# Patient Record
Sex: FEMALE | Race: ASIAN | NOT HISPANIC OR LATINO | Employment: UNEMPLOYED | URBAN - METROPOLITAN AREA
[De-identification: names, ages, dates, MRNs, and addresses within clinical notes are randomized per-mention and may not be internally consistent; named-entity substitution may affect disease eponyms.]

---

## 2020-12-22 ENCOUNTER — OFFICE VISIT (OUTPATIENT)
Dept: FAMILY MEDICINE | Facility: CLINIC | Age: 25
End: 2020-12-22

## 2020-12-22 VITALS
TEMPERATURE: 98 F | HEIGHT: 59 IN | BODY MASS INDEX: 21.97 KG/M2 | SYSTOLIC BLOOD PRESSURE: 105 MMHG | RESPIRATION RATE: 16 BRPM | DIASTOLIC BLOOD PRESSURE: 65 MMHG | OXYGEN SATURATION: 97 % | HEART RATE: 86 BPM | WEIGHT: 109 LBS

## 2020-12-22 DIAGNOSIS — N91.2 AMENORRHEA: ICD-10-CM

## 2020-12-22 DIAGNOSIS — Z32.01 PREGNANCY TEST POSITIVE: Primary | ICD-10-CM

## 2020-12-22 LAB — HCG UR QL: POSITIVE

## 2020-12-22 PROCEDURE — 81025 URINE PREGNANCY TEST: CPT | Performed by: STUDENT IN AN ORGANIZED HEALTH CARE EDUCATION/TRAINING PROGRAM

## 2020-12-22 PROCEDURE — 99213 OFFICE O/P EST LOW 20 MIN: CPT | Mod: GC | Performed by: STUDENT IN AN ORGANIZED HEALTH CARE EDUCATION/TRAINING PROGRAM

## 2020-12-22 SDOH — HEALTH STABILITY: MENTAL HEALTH: HOW OFTEN DO YOU HAVE A DRINK CONTAINING ALCOHOL?: NEVER

## 2020-12-22 SDOH — HEALTH STABILITY: MENTAL HEALTH: HOW MANY STANDARD DRINKS CONTAINING ALCOHOL DO YOU HAVE ON A TYPICAL DAY?: NOT ASKED

## 2020-12-22 SDOH — HEALTH STABILITY: MENTAL HEALTH: HOW OFTEN DO YOU HAVE 6 OR MORE DRINKS ON ONE OCCASION?: NEVER

## 2020-12-22 ASSESSMENT — MIFFLIN-ST. JEOR: SCORE: 1144.66

## 2020-12-22 NOTE — NURSING NOTE
Due to patient being non-English speaking/uses sign language, an  was used for this visit. Only for face-to-face interpretation by an external agency, date and length of interpretation can be found on the scanned worksheet.     name: cristina  Agency: [employer]  Language: Jez   Telephone number: [phone]  Type of interpretation: Telephone, spoken

## 2020-12-22 NOTE — PROGRESS NOTES
Assessment and Plan     Pregnancy:   LMP August, and due to insurance issues hasn't received any prenatal care up to this point. FHT's 150s which is reassuring. This is her first pregnancy, planned. Not taking any meds. Denies any vaginal discharge/bleeding, abdominal pain, or any worrisome s/s. b-hcg positive!  - Prenatal vitamin (OTC as no insurance)  - Dating US to be scheduled  - Will hold off on labs today, even though she's probably 4 months pregnant as no insurance and pt refused.      Options for treatment and follow-up care were reviewed with the patient and/or guardian. Belkis Sanchez and/or guardian engaged in the decision making process and verbalized understanding of the options discussed and agreed with the final plan.    Ash Holman DO, MBA  [facility], PGY-2    Precepted patient with Dr. Bakari Borjas III       HPI:   Belkis Sanchez is a 25 year old female who presents to clinic today for   Chief Complaint   Patient presents with     Confirmation Of Pregnancy     LMP in August, unsure of exact date. first pregnancy.     Medication Reconciliation     complete     Amenorrhea:  - LMP August  - First visit today  - First pregnancy  - Planned pregnancy  - Not taking prenatal vitamin  - No abd pain, vaginal discharge, LE swelling  - Wants to know how far along she is      A Hmong  was used for this visit    Denies F, CP, SOB, changes in vision/hearing/GI//diet, abdominal pain, numbness/tingling, or any other concerns.         PMHX:   Active Problems List  There is no problem list on file for this patient.      Current Medications  No current outpatient medications on file.       Social History  Social History     Tobacco Use     Smoking status: Never Smoker     Smokeless tobacco: Never Used   Substance Use Topics     Alcohol use: Never     Frequency: Never     Binge frequency: Never     Drug use: Never     History   Drug Use Unknown        Family History  Family History   Problem Relation Age of Onset     No Known Problems Mother      No Known Problems Father      No Known Problems Maternal Grandmother      No Known Problems Maternal Grandfather      No Known Problems Paternal Grandmother      No Known Problems Paternal Grandfather      No Known Problems Brother      No Known Problems Sister      No Known Problems Son      No Known Problems Daughter      No Known Problems Maternal Half-Brother      No Known Problems Maternal Half-Sister      No Known Problems Paternal Half-Brother      No Known Problems Paternal Half-Sister      No Known Problems Niece      No Known Problems Nephew      No Known Problems Cousin      No Known Problems Other      Cancer No family hx of      Diabetes No family hx of      Coronary Artery Disease No family hx of      Heart Disease No family hx of      Hypertension No family hx of      Hyperlipidemia No family hx of      Kidney Disease No family hx of      Cerebrovascular Disease No family hx of      Obesity No family hx of      Thrombosis No family hx of      Asthma No family hx of      Arthritis No family hx of      Thyroid Disease No family hx of      Depression No family hx of      Mental Illness No family hx of      Substance Abuse No family hx of      Cystic Fibrosis No family hx of      Early Death No family hx of      Coronary Artery Disease Early Onset No family hx of      Heart Failure No family hx of      Bleeding Diathesis No family hx of      Dementia No family hx of      Breast Cancer No family hx of      Ovarian Cancer No family hx of      Uterine Cancer No family hx of      Prostate Cancer No family hx of      Colorectal Cancer No family hx of      Pancreatic Cancer No family hx of      Lung Cancer No family hx of      Melanoma No family hx of      Autoimmune Disease No family hx of      Unknown/Adopted No family hx of      Genetic Disorder No family hx of        Allergies  No Known Allergies          "Physical Exam:     Vitals:    12/22/20 1551   BP: 105/65   BP Location: Right arm   Pulse: 86   Resp: 16   Temp: 98  F (36.7  C)   TempSrc: Oral   SpO2: 97%   Weight: 49.4 kg (109 lb)   Height: 1.49 m (4' 10.66\")     Body mass index is 22.27 kg/m .    GENERAL APPEARANCE: alert, appears stated age, no acute distress  HEENT: Eyes grossly normal to inspection, nares normal, and mouth and throat without erythema, ulcers, or lesions  RESP: lungs clear to auscultation - no rales, rhonchi, or wheezes  CV: regular rate and rhythm, no murmur, click, rub, or gallop  ABDOMEN: soft, nontender  OB: FHT 150s   MSK: extremities normal, no gross deformities noted, no lower extremity edema  SKIN: no suspicious lesions or rashes   NEURO: Normal strength and tone, sensory exam grossly normal, mentation appears intact and speech normal  PSYCH: mood and affect normal/bright         "

## 2020-12-29 ENCOUNTER — TELEPHONE (OUTPATIENT)
Dept: FAMILY MEDICINE | Facility: CLINIC | Age: 25
End: 2020-12-29

## 2020-12-29 NOTE — TELEPHONE ENCOUNTER
Intake completed via telephone. Belkis is a  Hmong-speaking patient who presented to clinic with a positive UPT. Belkis was born in [facility] and is currently unemployed. Her , Aguila, works in medical assembly. Both are excited for this pregnancy. Belkis does not have a significant medical history and has received the varicella and influenza vaccine. Belkis is unsure of her LMP and notes it to be sometime in August. Belkis will follow with  for OB care and NOB appointment will be scheduled once patient has insurance in January.     Average Risk Category  No significant risk factors: No    At Risk Category (up to 3)  Teen pregnancy: No  Poor social situation: No  Domestic abuse: No  Financial difficulties: No  Smoker: No  H/O  deliver: No  H/O drug abuse: No  Non-English speaking: Yes  Advanced maternal age: No  GDM risks: No  Previous C/S: No  H/O PIH: No  H/O STIs: No  H/O mental health concerns: No  Onset care > 20 weeks: Yes  Other:     High Risk Category (4 or more At Risk or)  Diabetes/GDM: No  Multiple gestation: No  Chronic hypertension: No  Significant hx of asthma: No  Fetal demise > 20 weeks: No  Positive tox screen: No  Current mental health treatment: No  Other:     Risk: At Risk   Date determined: 2020  Osbaldo ADAMS

## 2020-12-29 NOTE — TELEPHONE ENCOUNTER
[facility] phone call message- general phone call:    Reason for call: Patient state waiting to hear back on results of urine test. Patient states they were not inform during visit what the results (pregnant or not) were and has not got a phone call either. Would like to know next step in pregnancy.    Return call needed: Yes    OK to leave a message on voice mail? Yes    Primary language: Hmong      needed? Yes    Call taken on December 29, 2020 at 9:35 AM by Gunnar Cooper

## 2020-12-29 NOTE — TELEPHONE ENCOUNTER
Called and discussed with patient positive pregnancy result. Patient planning to have insurance start in January after due to being added to her 's insurance. Advised will reach out second week of January if I have not heard back yet to schedule US and NOB. NOB to be scheduled with Dr.Hunt. Roblero RN

## 2020-12-30 NOTE — PROGRESS NOTES
Preceptor Attestation:   Patient seen, evaluated and discussed with the resident. I have verified the content of the note, which accurately reflects my assessment of the patient and the plan of care.    Supervising Physician:Bakari Borjas MD    [facility]

## 2021-01-04 NOTE — TELEPHONE ENCOUNTER
Patient called to schedule US and stated she only wants Female Provider for her OB. Patient requesting to change OB Provider.

## 2021-01-07 ENCOUNTER — ANCILLARY PROCEDURE (OUTPATIENT)
Dept: ULTRASOUND IMAGING | Facility: CLINIC | Age: 26
End: 2021-01-07
Attending: FAMILY MEDICINE
Payer: COMMERCIAL

## 2021-01-07 DIAGNOSIS — N91.2 AMENORRHEA: ICD-10-CM

## 2021-01-07 PROCEDURE — 76805 OB US >/= 14 WKS SNGL FETUS: CPT | Performed by: RADIOLOGY

## 2021-01-07 NOTE — NURSING NOTE
Due to patient being non-English speaking/uses sign language, an  was used for this visit. Only for face-to-face interpretation by an external agency, date and length of interpretation can be found on the scanned worksheet.     name: deborah [phone]   Agency: [employer]  Language: Jez   Telephone number: [phone]  Type of interpretation: Telephone, spoken

## 2021-01-12 ENCOUNTER — OFFICE VISIT (OUTPATIENT)
Dept: FAMILY MEDICINE | Facility: CLINIC | Age: 26
End: 2021-01-12
Payer: COMMERCIAL

## 2021-01-12 VITALS
DIASTOLIC BLOOD PRESSURE: 63 MMHG | RESPIRATION RATE: 16 BRPM | HEIGHT: 59 IN | SYSTOLIC BLOOD PRESSURE: 99 MMHG | TEMPERATURE: 97.7 F | HEART RATE: 83 BPM | BODY MASS INDEX: 22.78 KG/M2 | WEIGHT: 113 LBS | OXYGEN SATURATION: 100 %

## 2021-01-12 DIAGNOSIS — Z34.02 SUPERVISION OF NORMAL FIRST PREGNANCY IN SECOND TRIMESTER: Primary | ICD-10-CM

## 2021-01-12 LAB
BILIRUBIN UR: NEGATIVE MG/DL
BLOOD UR: NEGATIVE MG/DL
GLU GEST SCREEN 1HR 50G: 201 (ref 0–129)
GLUCOSE URINE: ABNORMAL
HCT VFR BLD AUTO: 37.8 % (ref 35–47)
HEMOGLOBIN: 11.8 G/DL (ref 11.7–15.7)
HIV 1+2 AB+HIV1 P24 AG SERPL QL IA: NEGATIVE
KETONES UR QL: NEGATIVE MG/DL
LEUKOCYTE ESTERASE UR: NEGATIVE
MCH RBC QN AUTO: 31.5 PG (ref 26.5–35)
MCHC RBC AUTO-ENTMCNC: 31.2 G/DL (ref 32–36)
MCV RBC AUTO: 100.7 FL (ref 78–100)
NITRITE UR QL STRIP: NEGATIVE MG/DL
PH UR STRIP: 7 [PH] (ref 4.5–8)
PLATELET # BLD AUTO: 242 K/UL (ref 150–450)
PROTEIN UR: NEGATIVE MG/DL
RBC # BLD AUTO: 3.75 M/UL (ref 3.8–5.2)
SP GR UR STRIP: 1.02 (ref 1–1.03)
UROBILINOGEN UR STRIP-ACNC: ABNORMAL E.U./DL
WBC # BLD AUTO: 13.2 K/UL (ref 4–11)

## 2021-01-12 PROCEDURE — 99207 PR PRENATAL VISIT: CPT | Mod: GC | Performed by: STUDENT IN AN ORGANIZED HEALTH CARE EDUCATION/TRAINING PROGRAM

## 2021-01-12 PROCEDURE — 81003 URINALYSIS AUTO W/O SCOPE: CPT | Performed by: STUDENT IN AN ORGANIZED HEALTH CARE EDUCATION/TRAINING PROGRAM

## 2021-01-12 PROCEDURE — 82950 GLUCOSE TEST: CPT | Performed by: STUDENT IN AN ORGANIZED HEALTH CARE EDUCATION/TRAINING PROGRAM

## 2021-01-12 PROCEDURE — 85027 COMPLETE CBC AUTOMATED: CPT | Performed by: STUDENT IN AN ORGANIZED HEALTH CARE EDUCATION/TRAINING PROGRAM

## 2021-01-12 PROCEDURE — 36415 COLL VENOUS BLD VENIPUNCTURE: CPT | Performed by: STUDENT IN AN ORGANIZED HEALTH CARE EDUCATION/TRAINING PROGRAM

## 2021-01-12 ASSESSMENT — MIFFLIN-ST. JEOR: SCORE: 1160.25

## 2021-01-12 NOTE — LETTER
January 15, 2021      Belkis Sanchez  [address] [place] [address]        Dear ,    We are writing to inform you of your test results.    Attached are your prenatal lab results. They are within normal limits, aside from the glucose challenge which was abnormal (discussed in clinic last visit before you left). We have scheduled you for a 3-hour glucose challenge next week and will inform you of the results of this when they return.    Resulted Orders   ABO/Rh Type-HML ([facility])   Result Value Ref Range    ABO/Rh(D) B POS     Repeat ABO/Rh Typing (HML) B POS     Narrative    Test performed by:   [facility] [address], [place] [address]   [facility]ibody Screen [facility]   Result Value Ref Range    Antibody Screen Negative     Narrative    Test performed by:   [facility] [address], [place] [address]   CBC with Plt ([facility])   Result Value Ref Range    WBC 13.2 (H) 4.0 - 11.0 K/uL    RBC 3.75 (L) 3.80 - 5.20 M/uL    Hemoglobin 11.8 11.7 - 15.7 g/dL    Hematocrit 37.8 35.0 - 47.0 %    .7 (H) 78.0 - 100.0 fL    MCH 31.5 26.5 - 35.0 pg    MCHC 31.2 (L) 32.0 - 36.0 g/dL    Platelets 242.0 150.0 - 450.0 K/uL   Culture Urine ([facility])   Result Value Ref Range    Culture SEE RESULTS BELOW       Comment:      CULTURE, URINE   SOURCE: Urine, Clean Catch   CULTURE RESULTS:    No Growth      Narrative    Test performed by:  [facility]  [address] [place] [address]   Hepatitis B Surface Ag ([facility])   Result Value Ref Range    Hepatitis B Surface Antigen Negative Negative    Narrative    Test performed by:  [facility] [address] [place] [address]   HIV Ag/Ab Screen [place] ([facility])   Result Value Ref Range    HIV Antigen/Antibody Negative Negative    Narrative    Test performed by:  [facility] [address] [place] [address]  Method is Abbott HIV Ag/Ab for the detection of  HIV p24 antigen, HIV-1   antibodies and HIV-2 antibodies.   Lead, Blood ([facility])   Result Value Ref Range    Lead See Note.       Comment:      Reflex testing sent to [facility]. Result to be reported on the   separate reflexed test code.      Collection Method Venous     Narrative    Test performed by:  [facility] [address] [place] [address]   Rubella  IgG ([facility])   Result Value Ref Range    Rubella IgG Positive     Narrative    Test performed by:  [facility] [address] [place] [address]  Negative: Absence of detectable rubella virus IgG antibodies. A negative   result presumes that immunity has not been acquired.   Equivocal: Suggest recollection.  Positive: Considered positive for IgG antibodies to rubella virus.   Syphilis Screen [place] ([facility])   Result Value Ref Range    Treponema Antibody (Syphilis) Negative Negative    Narrative    Test performed by:  [facility] [address] [place] [address]   Urinalysis([facility])   Result Value Ref Range    Specific Gravity Urine 1.025 1.005 - 1.030    pH Urine 7.0 4.5 - 8.0    Leukocyte Esterase UR Negative NEGATIVE    Nitrite Urine Negative NEGATIVE mg/dL    Protein UR Negative NEGATIVE mg/dL    Glucose Urine 1+ (A) NEGATIVE    Ketones Urine Negative NEGATIVE mg/dL    Urobilinogen mg/dL 0.2 E.U./dL 0.2 E.U./dL E.U./dL    Bilirubin UR Negative NEGATIVE mg/dL    Blood UR Negative NEGATIVE mg/dL   Glucose Challenge  1 Hr  ([facility])   Result Value Ref Range    Glu Gest Screen 1hr 50g 201 (H) 0 - 129    Narrative    Pt informed to return next week for fasting 3 hours GTT. CXiong, RMA/MVang.   Chlamydia/Gono Amplified ([facility])   Result Value Ref Range    Chlamydia trac,Amplified Prb Negative Negative    N gonorrhoeae,Amplified Prb Negative Negative    Narrative    Test performed by:  [facility] [id number] [place] [address]   Lead With Demographics ([facility])   Result Value Ref Range    Lead, Blood (Venous) <2.0 <=4.9 ug/dL      Comment:      INTERPRETIVE INFORMATION: Lead, Blood (Venous)  Elevated results may be due to skin or collection-related   contamination, including the use of a noncertified lead-free tube.   If contamination concerns exist due to elevated levels of blood   lead, confirmation with a second specimen collected in a certified   lead-free tube is recommended.  Information sources for reference intervals and interpretive   comments include the \"CDC Response to the 2012 Advisory Committee   on Childhood Lead Poisoning Prevention Report\" and the   \"Recommendations for Medical Management of Adult Lead Exposure,   Environmental Health Perspectives, 2007.\" Thresholds and time   intervals for retesting, medical evaluation, and response vary by   state and regulatory body. Contact your State Department of Health   and/or applicable regulatory agency for specific guidance on   medical management recommendations.   Age            Concentration   Comment  All ages       5-9.9 ug/dL     Adverse health effects are                                   possible, particularly in                                 children under 6 years of                                 age and pregnant women.                                 Discuss health risks                                 associated with continued                                 lead exposure. For children                                 and women who are or may                                 become pregnant, reduce                                 lead exposure.               All ages        10-19.9 ug/dL  Reduced lead exposure and                                 increased biological                                 monitoring are recommended.  All ages        20-69.9 ug/dL  Removal from lead exposure                                 and prompt " medical                                 evaluation are recommended.                                 Consider chelation therapy                                 when concentrations exceed                                  50 ug/dL and symptoms of                                 lead toxicity are present.  Less than 19     Greater than  Critical. Immediate medical  years of age     44.9 ug/dL    evaluation is recommended.                                 Consider chelation therapy                                  when symptoms of lead                                 toxicity are present.  Greater than 19  Greater than  Critical. Immediate medical  years of age     69.9 ug/dL    evaluation is recommended                                 Consider chelation therapy                                 when symptoms of lead                                  toxicity are present.  Test developed and characteristics determined by [facility]. See Compliance Statement B: NotesFirst.[employer]/CS  Performed By: [facility]  [address] [place] [id number]  : Christine Barber MD      Narrative    Test performed by:  [facility]  [address] [place] [id number]       If you have any questions or concerns, please call the clinic at the number listed above.       Sincerely,      Dr. Major

## 2021-01-12 NOTE — PROGRESS NOTES
Preceptor Attestation:   Patient seen, evaluated and discussed with the resident. I have verified the content of the note, which accurately reflects my assessment of the patient and the plan of care.  Supervising Physician:Mindy Tamez MD  [facility]

## 2021-01-12 NOTE — PROGRESS NOTES
First Obstetric Visit       HARPREET Sanchez is a 25 year old woman who presents for an initial prenatal visit at 24w3d pregnant with STEVEN of  May 1, 2021 by second trimester ultrasound by No LMP recorded. Patient is pregnant..  She has not had bleeding since her LMP. She had some nausea and emesis at the beginning but this has stopped. Weight loss has not occurred.  This was a planned pregnancy.     OTHER CONCERNS: Patient is planning to breastfeed and is inquiring about a free breast pump. Told her this may be covered by insurance.     Labor Risk Assessment   Is the patient's age <18 or >40?    No  Patint's BMI is Body mass index is 23.22 kg/m . Does patient have a BMI < 18.5?    No  If previous pregnancy, was delivery within previous 6 months?    No  Have you ever delivered a baby prior to 37 weeks gestation?    No  Did conception for this pregnancy occur via In Vitro Fertilization?    No  Are you carrying twins?    No    Summary:  Patient is Average/high risk for  Labor (verify Problem List includes V23.9 and note risk factor(s) in the Comments)  The patient has the following risk factors for  labor:  None  Average risk pregnancy  Past Medical History  History reviewed. No pertinent past medical history.  Do you have a history of any of the following medical conditions?    Condition Yes/No   Hypertension No   Heart disease, mitral valve prolapse, rheumatic fever No   Asthma or another chronic lung disease No   An autoimmune disorder No   Kidney disease No   Frequent urinary tract infections No   Migraine headaches No   Stroke, loss of sensation/function, seizures, or other neuro problem No   Diabetes No   Thyroid problems or have you taken thyroid medication No   Hepatitis, liver disease, jaundice No   Blood clots, phlebitis, pulmonary embolism or varicose veins No   Excessive bleeding after surgery or dental work No   Heavy menstrual periods requiring treatment No   Anemia No   Blood  transfusions No        Would you refuse a blood transfusion? No   Breast problems No   Abnormalities of the uterus No   Abnormal pap smear No   Have you been treated for an emotional disturbance? No   Have you been physically, sexually, or emotionally hurt by someone? No   Have you been in a major accident or suffered serious trauma? No   Have you had surgical procedures? No        Have you ever had any complications from anesthesia? No   Have you ever been hospitalized for a nonsurgical reason? No     Notes for positives None    Prenatal Genetic Screening    Do you have a history of any of the following Yes/No        A metabolic disorder (e.g. Insulin-dependent DM, PKU) No        Recurrent pregnancy loss No     Do you, the baby's father, or anyone in your families have Yes/No        Thalassemia AND MCV <80 No        Hemophilia No        Neural tube defect No        Congenital heart defect No        Sickle cell disease or trait No        Muscular dystrophy No        Cystic fibrosis No        Mental retardation or autism No        Down's syndrome No        Elvis-Sach's disease No        C[place]'s chorea No        Any other inherited genetic or chromosomal disorder No        A child with birth defects not listed above No     Infection History    At high risk for coming in contact with HIV No   Ever treated for tuberculosis No   Ever received the BCG vaccine for tuberculosis No   Ever had genital herpes (or has your partner) No   Had a rash or viral illness since LMP No   Ever had a sexually transmitted infection No   Ever had chicken pox or the vaccine No   Ever had any other serious infectious disease No   Up to date with immunizations Yes       Habits  Non-smoker, No ETOH, 1 caffeinated beverages every once in a while and Regular exercise.  Current medications are:  No current outpatient medications on file.       REVIEW OF SYSTEMS  CONSTITUTIONAL: NEGATIVE for fever, chills, change in weight  INTEGUMENTARU/SKIN:  "NEGATIVE for worrisome rashes, moles or lesions  EYES: NEGATIVE for vision changes or irritation  ENT: NEGATIVE for ear, mouth and throat problems  RESP: NEGATIVE for significant cough or SOB  CV: NEGATIVE for chest pain, palpitations or peripheral edema  GI: NEGATIVE for nausea, abdominal pain, heartburn, or change in bowel habits  : NEGATIVE for unusual urinary or vaginal symptoms. Periods are regular.  MUSCULOSKELETAL: NEGATIVE for significant arthralgias or myalgia  NEURO: NEGATIVE for weakness, dizziness or paresthesias  C: NEGATIVE for fever, chills, change in weight  I: NEGATIVE for worrisome rashes, moles or lesions  E: NEGATIVE for vision changes or irritation  R: NEGATIVE for significant cough or SOB  B: NEGATIVE for masses, tenderness or discharge  M: NEGATIVE for significant arthralgias or myalgia  N: NEGATIVE for weakness, dizziness or paresthesias  P: NEGATIVE for changes in mood or affect  ====================================================    PHYSICAL EXAM:  BP 99/63 (BP Location: Right arm)   Pulse 83   Temp 97.7  F (36.5  C) (Oral)   Resp 16   Ht 1.486 m (4' 10.5\")   Wt 51.3 kg (113 lb)   SpO2 100%   BMI 23.22 kg/m         GENERAL:  Pleasant pregnant female, alert, well groomed.  SKIN:  Warm and dry, without lesions or rashes  HEAD: Symmetrical features.  EYES:  PERRL, EOMI.  MOUTH:  Buccal mucosa pink, moist without lesions.    NECK:  Thyroid without enlargement and nodules.  Lymph nodes not palpable.  LUNGS:  Clear to auscultation.  HEART:  RRR without murmur.  ABDOMEN: Soft without masses, tenderness or organomegaly.  No CVA tenderness. No scars noted.. . Fundal height 24 cm.  MUSCULOSKELETAL:  Full range of motion  EXTREMITIES:  No edema. No significant varicosities.   GENITALIA:  Refused  VAGINA:  Refused  CERVIX:  Refused  =========================================    ASSESSMENT/PLAN  1.Supervision of normal pregnancy in second trimester: Average risk pregnancy, no red flags " in personal or family history. Will obtain regular prenatal lab panel today with the exception of pap smear (declined).    2.Elevated blood glucose: Patient failed 1 hour glucose challenge with glucose of 201. Will schedule 3 hour glucose challenge for next week.    Average risk pregnancy  Recommended weight gain: 25-35 lbs.  Instructed on best evidence for: weight gain for her BMI for pregnancy; healthy diet and foods to avoid; exercise and activity during pregnancy;  avoiding exposure to toxoplasmosis; and maintenance of a generally healthy lifestyle.   Discussed the harms, benefits, side effects and alternative therapies for current prescribed and OTC medications.    Will follow up in 4 weeks for routine prenatal visit. Will give Tdap and rhogam if applicable at that time.    Options for treatment and follow-up care were reviewed with the patient and/or guardian. Lae Spanish and/or guardian engaged in the decision making process and verbalized understanding of the options discussed and agreed with the final plan.    Yue Major MD      Precepted with Mindy Tamez MD.

## 2021-01-12 NOTE — NURSING NOTE
Due to patient being non-English speaking/uses sign language, an  was used for this visit. Only for face-to-face interpretation by an external agency, date and length of interpretation can be found on the scanned worksheet.     name: quinten [id number]  Agency: [employer] ([employer] only)  Language: Jez   Telephone number:   Type of interpretation: Telemedicine, spoken

## 2021-01-13 LAB
ABO + RH BLD: NORMAL
BLD GP AB SCN SERPL QL: NEGATIVE
CULTURE: NORMAL
HBV SURFACE AG SERPL QL IA: NEGATIVE
REPEAT ABO/RH TYPING (HML): NORMAL
RUBV IGG SERPL QL IA: POSITIVE
TREPONEMA ANTIBODY (SYPHILIS): NEGATIVE

## 2021-01-14 LAB
C TRACH RRNA SPEC QL NAA+PROBE: NEGATIVE
N GONORRHOEA RRNA SPEC QL NAA+PROBE: NEGATIVE

## 2021-01-15 LAB
COLLECTION METHOD: NORMAL
LEAD BLD-MCNC: NORMAL UG/DL
LEAD BLDV-MCNC: <2 UG/DL

## 2021-01-15 NOTE — RESULT ENCOUNTER NOTE
Please mail the results to the patient:    Dear Ms. Sanchez,    Attached are your prenatal lab results. They are within normal limits, aside from the glucose challenge which was abnormal (discussed in clinic last visit before you left). We have scheduled you for a 3-hour glucose challenge next week and will inform you of the results of this when they return.    Sincerely,    Yue Major MD

## 2021-01-19 DIAGNOSIS — Z34.02 SUPERVISION OF NORMAL FIRST PREGNANCY IN SECOND TRIMESTER: ICD-10-CM

## 2021-01-19 LAB
GLU, 1 HOUR, 100 G: 128 MG/DL
GLU, 2 HOUR, 100 G: 59 MG/DL
GLU, 3 HOUR, 100 G: 94 MG/DL
GLUCOSE P FAST SERPL-MCNC: 67 MG/DL

## 2021-01-19 PROCEDURE — 82951 GLUCOSE TOLERANCE TEST (GTT): CPT

## 2021-01-19 PROCEDURE — 36415 COLL VENOUS BLD VENIPUNCTURE: CPT

## 2021-01-21 ENCOUNTER — TELEPHONE (OUTPATIENT)
Dept: FAMILY MEDICINE | Facility: CLINIC | Age: 26
End: 2021-01-21

## 2021-01-21 NOTE — TELEPHONE ENCOUNTER
Pregnancy episode resolved at [facility]. OB coordinator was informed by clinic call center, [facility] will be faxing to [facility] a completed release of information to obtain all records. Printed prenatal records, labs and ultrasound given to Farnaz Jason, call center patient representative. Farnaz will complete records request and fax above information and cancel upcoming scheduled routine OB visit at [facility] for 2/9/2021.  Thank you.  Aminata ADAMS

## 2021-05-10 ENCOUNTER — OFFICE VISIT (OUTPATIENT)
Dept: FAMILY MEDICINE | Facility: CLINIC | Age: 26
End: 2021-05-10
Payer: COMMERCIAL

## 2021-05-10 VITALS
BODY MASS INDEX: 24.6 KG/M2 | OXYGEN SATURATION: 97 % | DIASTOLIC BLOOD PRESSURE: 71 MMHG | HEIGHT: 59 IN | WEIGHT: 122 LBS | TEMPERATURE: 98.7 F | HEART RATE: 84 BPM | RESPIRATION RATE: 18 BRPM | SYSTOLIC BLOOD PRESSURE: 111 MMHG

## 2021-05-10 DIAGNOSIS — L76.82 PAIN AT SURGICAL INCISION: Primary | ICD-10-CM

## 2021-05-10 PROCEDURE — 99213 OFFICE O/P EST LOW 20 MIN: CPT | Performed by: FAMILY MEDICINE

## 2021-05-10 ASSESSMENT — MIFFLIN-ST. JEOR: SCORE: 1203.63

## 2021-05-10 NOTE — NURSING NOTE
Due to patient being non-English speaking/uses sign language, an  was used for this visit. Only for face-to-face interpretation by an external agency, date and length of interpretation can be found on the scanned worksheet.     name: Dorothy  Agency: [employer]  Language: Jez   Telephone number:   Type of interpretation: Face-to-face, spoken

## 2021-05-10 NOTE — PROGRESS NOTES
HPI:   Belkis Sanchez is a 25 year old  female  who presents for:    Chief Complaint   Patient presents with     RECHECK     F/U: , pt. reported she is still feeling pain near the incision      Medication Reconciliation     Completed Pt. reported taking iron supplement, stool softner and tylenol.      25-year-old female who underwent a  for preeclampsia 1 week ago.   was uncomplicated.  She presents for discomfort at the incision site when sitting up from a lying position.  She has not had any skin breaks.  No skin redness at the incision site.  No drainage.  No abdominal swelling.  She has been ambulating normally since the time of hospital discharge.  She is able to care for her baby and carry out her daily activities without abdominal pain.    She is not using any medications for pain.    Minimal vaginal bleeding which is improved over the past 2 days.    She is monitoring her blood pressure at home with systolic blood pressures in the 90s.  She is checking her blood pressure every day.         PMHX:   There is no problem list on file for this patient.      No current outpatient medications on file.       Social History     Tobacco Use     Smoking status: Never Smoker     Smokeless tobacco: Never Used   Substance Use Topics     Alcohol use: Never     Frequency: Never     Binge frequency: Never     Drug use: Never       Social History     Social History Narrative     Not on file       No Known Allergies    No results found for this or any previous visit (from the past 24 hour(s)).         Review of Systems:     General: No fevers or chills  ENT: No upper respiratory symptoms.  No headache.  No visual changes.  No double vision.  Cardiovascular: No chest pain or palpitations.  No leg swelling.  Respiratory: No shortness of breath.  No dyspnea with exertion.  GI: No epigastric pain.  No constipation.  Neurologic: No dizzines.  No vision changes.            Physical Exam:     Vitals:     "05/10/21 1301   BP: 111/71   BP Location: Right arm   Patient Position: Sitting   Cuff Size: Adult Regular   Pulse: 84   Resp: 18   Temp: 98.7  F (37.1  C)   TempSrc: Oral   SpO2: 97%   Weight: 55.3 kg (122 lb)   Height: 1.49 m (4' 10.66\")     Body mass index is 24.93 kg/m .    General: Alert, in no acute distress  HEENT: Head is free of trauma.   Sclerae non-icteric. PERRL, Moist oral mucus membranes, no tonsilar hypertrophy or exudate.   Resp: Clear to auscultation bilaterally  CV: Regular rate and rhythm  Abd: Soft, non-tender.  Incision is well approximated.  No erythema.  No tenderness.  No bulging with Valsalva.  No drainage.    Ext: Warm.    Skin: exposed skin free of rash  Psych: Mood appropriate       Assessment and Plan   1. Pain at surgical incision  Incision site looks healthy, well approximated, no sign of infection  Her symptoms are most consistent with routine post  section discomfort.  She can use Tylenol 2 tablets up to 3 times a day as needed for pain.  Avoid abdominal straining when sitting up, avoid heavy lifting.  Anticipate improvement over the next 2 weeks.    Recommend she [facility] in 2 to 4 weeks for routine postoperative check, earlier as needed,    Continue to monitor blood pressure at home, and call clinic for any systolic blood pressures over 140, or diastolics over 90.      Follow up: 3 weeks  Options for treatment and follow-up care were reviewed with the patient and/or guardian. Lae Daniel and/or guardian engaged in the decision making process and verbalized understanding of the options discussed and agreed with the final plan.    Peter Monae MD  Faculty - [facility]                      "

## 2021-05-10 NOTE — PATIENT INSTRUCTIONS
Your surgical incision looks healthy and healing well    Follow up with the [facility] in about 2 to 4 weeks for a post operative check.    Follow up here at [facility] in 3-4 weeks

## 2021-06-21 ENCOUNTER — OFFICE VISIT (OUTPATIENT)
Dept: FAMILY MEDICINE | Facility: CLINIC | Age: 26
End: 2021-06-21
Payer: COMMERCIAL

## 2021-06-21 VITALS
BODY MASS INDEX: 22.13 KG/M2 | TEMPERATURE: 98.8 F | SYSTOLIC BLOOD PRESSURE: 96 MMHG | DIASTOLIC BLOOD PRESSURE: 61 MMHG | RESPIRATION RATE: 18 BRPM | HEART RATE: 65 BPM | OXYGEN SATURATION: 98 % | HEIGHT: 59 IN | WEIGHT: 109.8 LBS

## 2021-06-21 PROCEDURE — 99213 OFFICE O/P EST LOW 20 MIN: CPT | Mod: GC | Performed by: STUDENT IN AN ORGANIZED HEALTH CARE EDUCATION/TRAINING PROGRAM

## 2021-06-21 RX ORDER — FERROUS SULFATE 325(65) MG
TABLET ORAL
COMMUNITY
Start: 2021-05-04 | End: 2022-08-09

## 2021-06-21 RX ORDER — PRENATAL VIT/IRON FUM/FOLIC AC 27MG-0.8MG
1 TABLET ORAL DAILY
Qty: 90 TABLET | Refills: 3 | Status: SHIPPED | OUTPATIENT
Start: 2021-06-21 | End: 2022-08-09

## 2021-06-21 ASSESSMENT — MIFFLIN-ST. JEOR: SCORE: 1148.29

## 2021-06-21 NOTE — NURSING NOTE
Due to patient being non-English speaking/uses sign language, an  was used for this visit. Only for face-to-face interpretation by an external agency, date and length of interpretation can be found on the scanned worksheet.     name: Josue Donahue  Agency: [employer]  Language: Jez   Telephone number:   Type of interpretation: Face-to-face, spoken

## 2021-06-21 NOTE — PROGRESS NOTES
Faculty Supervision of Residents   I have examined this patient and the medical care has been evaluated and discussed with the resident. See resident note to follow outlining our discussion.    DOS 6/21/2021    Wendy Burnett MD
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
## 2021-07-05 ENCOUNTER — MEDICAL CORRESPONDENCE (OUTPATIENT)
Dept: HEALTH INFORMATION MANAGEMENT | Facility: CLINIC | Age: 26
End: 2021-07-05

## 2021-07-29 ENCOUNTER — IMMUNIZATION (OUTPATIENT)
Dept: FAMILY MEDICINE | Facility: CLINIC | Age: 26
End: 2021-07-29
Payer: COMMERCIAL

## 2021-07-29 PROCEDURE — 91301 PR COVID VAC MODERNA 100 MCG/0.5 ML IM: CPT

## 2021-07-29 PROCEDURE — 0011A PR COVID VAC MODERNA 100 MCG/0.5 ML IM: CPT

## 2021-08-14 ENCOUNTER — MEDICAL CORRESPONDENCE (OUTPATIENT)
Dept: HEALTH INFORMATION MANAGEMENT | Facility: CLINIC | Age: 26
End: 2021-08-14

## 2021-11-10 ENCOUNTER — OFFICE VISIT (OUTPATIENT)
Dept: FAMILY MEDICINE | Facility: CLINIC | Age: 26
End: 2021-11-10
Payer: COMMERCIAL

## 2021-11-10 VITALS
SYSTOLIC BLOOD PRESSURE: 111 MMHG | HEIGHT: 58 IN | DIASTOLIC BLOOD PRESSURE: 72 MMHG | TEMPERATURE: 98 F | WEIGHT: 97 LBS | RESPIRATION RATE: 16 BRPM | OXYGEN SATURATION: 99 % | HEART RATE: 71 BPM | BODY MASS INDEX: 20.36 KG/M2

## 2021-11-10 DIAGNOSIS — T74.31XA VERBAL ABUSE OF ADULT, INITIAL ENCOUNTER: ICD-10-CM

## 2021-11-10 DIAGNOSIS — R44.3 HALLUCINATIONS: Primary | ICD-10-CM

## 2021-11-10 PROCEDURE — 99214 OFFICE O/P EST MOD 30 MIN: CPT | Mod: GC | Performed by: STUDENT IN AN ORGANIZED HEALTH CARE EDUCATION/TRAINING PROGRAM

## 2021-11-10 RX ORDER — QUETIAPINE FUMARATE 25 MG/1
12.5 TABLET, FILM COATED ORAL AT BEDTIME
Qty: 30 TABLET | Refills: 1 | Status: SHIPPED | OUTPATIENT
Start: 2021-11-10 | End: 2021-11-17

## 2021-11-10 ASSESSMENT — MIFFLIN-ST. JEOR: SCORE: 1074.74
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
## 2021-11-10 NOTE — PROGRESS NOTES
Preceptor Attestation:   Patient seen, evaluated and discussed with the resident. I have verified the content of the note, which accurately reflects my assessment of the patient and the plan of care.  Supervising Physician:Shahid Dang MD  [facility]

## 2021-11-10 NOTE — PROGRESS NOTES
SW met with patient during clinic visit this date at request of attending physician. SW introduced self to patient as clinic staff who will be following up with patient regarding MH referral being ordered by attending physician this date. Patient reported understanding but only wanting to speak about medical concerns this date. Patient appeared significantly fearful and uncomfortable as evidenced by rigid body posture and not making eye contact with SW this date. Patient seemed to disclose more information with female attending physician this date; patient may benefit more from female providers and interpreters at future appointments.    CAMILLE ODOM, BRANDTSW, LADC

## 2021-11-11 ENCOUNTER — DOCUMENTATION ONLY (OUTPATIENT)
Dept: PSYCHOLOGY | Facility: CLINIC | Age: 26
End: 2021-11-11
Payer: COMMERCIAL
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
## 2021-11-12 ENCOUNTER — TELEPHONE (OUTPATIENT)
Dept: FAMILY MEDICINE | Facility: CLINIC | Age: 26
End: 2021-11-12
Payer: COMMERCIAL

## 2021-11-12 NOTE — TELEPHONE ENCOUNTER
A female [employer]  was used. Called patient to discuss referral, no answer. Unable to leave VM. Will attempt to contact again at another time. If after second attempt no contact is made,referral will be discussed at upcoming appointment on 11/17/21.  Osbaldo ADAMS

## 2021-11-16 NOTE — TELEPHONE ENCOUNTER
Called with . No answer and unable to leave VM due to VM not set up per . Plan to speak with patient tomorrow while in clinic. Osbaldo ADAMS

## 2021-11-17 ENCOUNTER — OFFICE VISIT (OUTPATIENT)
Dept: FAMILY MEDICINE | Facility: CLINIC | Age: 26
End: 2021-11-17
Payer: COMMERCIAL

## 2021-11-17 VITALS
TEMPERATURE: 98.5 F | BODY MASS INDEX: 20.57 KG/M2 | SYSTOLIC BLOOD PRESSURE: 112 MMHG | DIASTOLIC BLOOD PRESSURE: 69 MMHG | OXYGEN SATURATION: 98 % | RESPIRATION RATE: 20 BRPM | WEIGHT: 98 LBS | HEART RATE: 65 BPM | HEIGHT: 58 IN

## 2021-11-17 DIAGNOSIS — R44.3 HALLUCINATIONS: Primary | ICD-10-CM

## 2021-11-17 PROCEDURE — 99214 OFFICE O/P EST MOD 30 MIN: CPT | Mod: GC | Performed by: STUDENT IN AN ORGANIZED HEALTH CARE EDUCATION/TRAINING PROGRAM

## 2021-11-17 RX ORDER — QUETIAPINE FUMARATE 25 MG/1
12.5 TABLET, FILM COATED ORAL AT BEDTIME
Qty: 90 TABLET | Refills: 0 | Status: SHIPPED | OUTPATIENT
Start: 2021-11-17 | End: 2022-08-09

## 2021-11-17 ASSESSMENT — MIFFLIN-ST. JEOR: SCORE: 1079.28

## 2021-11-17 NOTE — PROGRESS NOTES
HPI:       Belkis Sanchez is a 26 year old  female with PMH of auditory and visual hallucinations who presents for follow up for these conditions, recently started on Seroquel.       Patient was seen a week ago. Patient states she is good. She is sleeping a little better. Her auditory and visual hallucinations are improved. Still hears voices. The medication is helping her sleep and she doesn't hear the voices. But when she is awake, she still hears them. The voices are still making her angry. Not seeing anymore shadows. No SI, no HI. Baby is doing well. Home is still very stressful, when he comes home, she thinks about the past times that her family has made her angry. Not feeling more unsafe since the last time she was here. She keeps thinking that she was to go somewhere else to live, she would like to raise daughter first here and but ultimately wants to leave the house she is in now due to bad memories.     Mother states that patient's mind is in the right mindset and doesn't know how to answer correctly. Mother states she had to force Belkis to come to this appointment, she did not want to come.     Mother states that they do not want her to go to inpatient psychiatry and would like her to go to a regular hospital.    A [employer]  was used for this visit         PMHX:     Patient Active Problem List   Diagnosis     Hallucinations       Current Outpatient Medications   Medication Sig Dispense Refill     QUEtiapine (SEROQUEL) 25 MG tablet Take 0.5 tablets (12.5 mg) by mouth At Bedtime 30 tablet 1     FEROSUL 325 (65 Fe) MG tablet        Prenatal Vit-Fe Fumarate-FA (PRENATAL MULTIVITAMIN W/IRON) 27-0.8 MG tablet Take 1 tablet by mouth daily 90 tablet 3      Allergies   Allergen Reactions     No Known Allergies        No results found for this or any previous visit (from the past 24 hour(s)).         Review of Systems:     12 point review of systems negative unless stated in HPI          Physical Exam:  "    Vitals:    11/17/21 1623   BP: 112/69   Pulse: 65   Resp: 20   Temp: 98.5  F (36.9  C)   SpO2: 98%   Weight: 44.5 kg (98 lb)   Height: 1.473 m (4' 10\")     Body mass index is 20.48 kg/m .    GENERAL APPEARANCE: healthy, alert and no distress  EYES: Eyes grossly normal to inspection  RESP: lungs clear to auscultation - no rales, rhonchi or wheezes  CV: regular rate and rhythm,  and no murmur, click,  rub or gallop  MS: extremities normal- no gross deformities noted  SKIN: no suspicious lesions or rashes  NEURO: Normal strength and tone, sensory exam grossly normal, mentation appears intact and speech normal  PSYCH: mood and affect depressed/flat      Assessment and Plan     1. Hallucinations  Patient was started on 12.5 mg Seroquel at bedtime. Helping with sleep and with visual hallucinations, although she is still having persistent auditory hallucinations. She does not want to increase due to cares for infant during the night.. Patient feels very sleepy on the dose she on right now. She does not want to do a telemedicine because she doesn't like to use the phone, her mother states that she will often turn her phone off. We will continue to have patient see us weekly until she can establish with a psychiatrist with concern for schizophrenia or related pathology. Referral to psychiatry made last week.   - F/u in 1 week    Options for treatment and follow-up care were reviewed with the patient and/or guardian. Pt and/or guardian engaged in the decision making process and verbalized understanding of the options discussed and agreed with the final plan.    Precepted today with: Benjamin Rosenstein, MD Leslie R. Neher, MD  [facility] Family Medicine Resident PGY-3  [facility]      "

## 2021-11-17 NOTE — PROGRESS NOTES
Preceptor Attestation:   Patient seen, evaluated and discussed with the resident Dr. Holguin. I have verified the content of the note, which accurately reflects my assessment of the patient and the plan of care.    Improvement in visual hallucinations with quetiapine, continues to have auditory hallucinations. Will continue close follow-up while awaiting psychiatry involvement for suspected schizophrenia.     Supervising Physician:Benjamin Rosenstein, MD, MA [facility]

## 2021-11-17 NOTE — NURSING NOTE
Due to patient being non-English speaking/uses sign language, an  was used for this visit. Only for face-to-face interpretation by an external agency, date and length of interpretation can be found on the scanned worksheet.     name: Angela godwin  Agency: [employer]  Language: Jez   Telephone number: [phone]  Type of interpretation: Face-to-face, spoken

## 2021-11-26 ENCOUNTER — OFFICE VISIT (OUTPATIENT)
Dept: FAMILY MEDICINE | Facility: CLINIC | Age: 26
End: 2021-11-26
Payer: COMMERCIAL

## 2021-11-26 VITALS
TEMPERATURE: 98.2 F | BODY MASS INDEX: 20.06 KG/M2 | WEIGHT: 99.5 LBS | OXYGEN SATURATION: 98 % | RESPIRATION RATE: 16 BRPM | HEIGHT: 59 IN | SYSTOLIC BLOOD PRESSURE: 102 MMHG | HEART RATE: 75 BPM | DIASTOLIC BLOOD PRESSURE: 63 MMHG

## 2021-11-26 DIAGNOSIS — R44.3 HALLUCINATIONS: Primary | ICD-10-CM

## 2021-11-26 PROCEDURE — 99213 OFFICE O/P EST LOW 20 MIN: CPT | Mod: GC | Performed by: STUDENT IN AN ORGANIZED HEALTH CARE EDUCATION/TRAINING PROGRAM

## 2021-11-26 ASSESSMENT — MIFFLIN-ST. JEOR: SCORE: 1096.57

## 2021-11-26 NOTE — PROGRESS NOTES
Preceptor Attestation:  Patient's case reviewed and discussed with the resident, Mel Holguin MD, and I personally evaluated the patient. I agree with written assessment and plan of care.    Supervising Physician:  Janis Verma MD   [facility]

## 2021-11-26 NOTE — PROGRESS NOTES
"     HPI:       Belkis Sanchez is a 26 year old  female with PMH of auditory and visual hallucinations who presents for follow up for these issues.     is here for first time. States his understanding is that his wife is here because she hears voices.    Belkis states that the medication makes her very tired and sleepy. Stopped taking medication a few days ago. She doesn't hear voices anymore. Sometimes when she can't sleep she takes the medication. Belkis states she is not hearing voices anymore but  states that she is still hearing voices. When she takes her medication around 1 am.     She states that no one has called her about a psychiatry appointment.      states that it is complicated as he is working and he states he cannot help with the infant at night.     A [employer]  was used for this visit         PMHX:     Patient Active Problem List   Diagnosis     Hallucinations       Current Outpatient Medications   Medication Sig Dispense Refill     Prenatal Vit-Fe Fumarate-FA (PRENATAL MULTIVITAMIN W/IRON) 27-0.8 MG tablet Take 1 tablet by mouth daily 90 tablet 3     FEROSUL 325 (65 Fe) MG tablet  (Patient not taking: Reported on 11/26/2021)       QUEtiapine (SEROQUEL) 25 MG tablet Take 0.5 tablets (12.5 mg) by mouth At Bedtime (Patient not taking: Reported on 11/26/2021) 90 tablet 0          Allergies   Allergen Reactions     No Known Allergies        No results found for this or any previous visit (from the past 24 hour(s)).         Review of Systems:     12 point review of systems negative unless stated in HPI           Physical Exam:     Vitals:    11/26/21 1548   BP: 102/63   BP Location: Right arm   Cuff Size: Adult Regular   Pulse: 75   Resp: 16   Temp: 98.2  F (36.8  C)   TempSrc: Oral   SpO2: 98%   Weight: 45.1 kg (99 lb 8 oz)   Height: 1.49 m (4' 10.66\")     Body mass index is 20.33 kg/m .    GENERAL APPEARANCE: healthy, alert and no distress  EYES: Eyes grossly normal to inspection  RESP: " lungs clear to auscultation - no rales, rhonchi or wheezes  CV: regular rate and rhythm,  and no murmur, click,  rub or gallop  SKIN: no suspicious lesions or rashes  NEURO: Normal strength and tone, sensory exam grossly normal, mentation appears intact and speech normal  PSYCH: flat affect    Assessment and Plan     1. Cheng Tamayo does not want to continue taking Seroquel as it makes her too sleepy. We will plan to take a holiday from it this week with close follow up.  - Follow up in 1 week.    Options for treatment and follow-up care were reviewed with the patient and/or guardian. Pt and/or guardian engaged in the decision making process and verbalized understanding of the options discussed and agreed with the final plan.    Precepted today with: MD Mel Bucio MD  [facility] Family Medicine Resident PGY-3  [facility]  Pager: [phone]

## 2021-12-27 NOTE — TELEPHONE ENCOUNTER
Patient had declined services when discussed in clinic, refer to note in 11/17 encounter. Unable to discuss at follow up appointment. This RN faxed referral to [facility] with request to reach patient and schedule with [facility] . Osbaldo ADAMS

## 2022-01-10 ENCOUNTER — PATIENT OUTREACH (OUTPATIENT)
Dept: FAMILY MEDICINE | Facility: CLINIC | Age: 27
End: 2022-01-10
Payer: COMMERCIAL

## 2022-01-11 ENCOUNTER — PATIENT OUTREACH (OUTPATIENT)
Dept: FAMILY MEDICINE | Facility: CLINIC | Age: 27
End: 2022-01-11
Payer: COMMERCIAL

## 2022-01-11 NOTE — PROGRESS NOTES
Clinic Care Coordination Contact    Follow Up Progress Note      Assessment: I talked to  about the pt letter. She informed me that she did not feel comfortable with writing a letter of support for the pt. She felt that she does not know the pt well, and the pt has not followed up in a while. She would like the pt to follow up to determine her mental status.     I called the pt friend Paola, who brought her in yesterday. I informed her what  had told me. I told her that the pt will need to come in for further evaluation.     Care Gaps:    Health Maintenance Due   Topic Date Due     PREVENTIVE CARE VISIT  Never done     ADVANCE CARE PLANNING  Never done     HPV IMMUNIZATION (1 - 2-dose series) Never done     HEPATITIS C SCREENING  Never done     PAP  Never done     COVID-19 Vaccine (2 - Moderna 3-dose series) 08/26/2021     INFLUENZA VACCINE (1) 09/01/2021     PHQ-2  01/01/2022       Currently there are no Care Gaps.    Goals addressed this encounter:   Goals Addressed    None         Intervention/Education provided during outreach: NA          Plan:     Care Coordinator will follow up in

## 2022-08-09 ENCOUNTER — OFFICE VISIT (OUTPATIENT)
Dept: FAMILY MEDICINE | Facility: CLINIC | Age: 27
End: 2022-08-09
Payer: COMMERCIAL

## 2022-08-09 VITALS
BODY MASS INDEX: 19.56 KG/M2 | SYSTOLIC BLOOD PRESSURE: 99 MMHG | RESPIRATION RATE: 20 BRPM | HEIGHT: 59 IN | OXYGEN SATURATION: 98 % | DIASTOLIC BLOOD PRESSURE: 64 MMHG | TEMPERATURE: 97.9 F | WEIGHT: 97 LBS | HEART RATE: 72 BPM

## 2022-08-09 DIAGNOSIS — Z00.00 ROUTINE GENERAL MEDICAL EXAMINATION AT A HEALTH CARE FACILITY: ICD-10-CM

## 2022-08-09 DIAGNOSIS — R44.3 HALLUCINATIONS: ICD-10-CM

## 2022-08-09 DIAGNOSIS — Z00.00 HEALTHCARE MAINTENANCE: Primary | ICD-10-CM

## 2022-08-09 LAB
CHOLEST SERPL-MCNC: 150 MG/DL
GLUCOSE SERPL-MCNC: 94 MG/DL (ref 70–99)
HDLC SERPL-MCNC: 49 MG/DL
LDLC SERPL CALC-MCNC: 82 MG/DL
NONHDLC SERPL-MCNC: 101 MG/DL
TRIGL SERPL-MCNC: 97 MG/DL

## 2022-08-09 PROCEDURE — 36415 COLL VENOUS BLD VENIPUNCTURE: CPT | Performed by: MASSAGE THERAPIST

## 2022-08-09 PROCEDURE — 80061 LIPID PANEL: CPT | Performed by: MASSAGE THERAPIST

## 2022-08-09 PROCEDURE — 99395 PREV VISIT EST AGE 18-39: CPT | Mod: GC | Performed by: MASSAGE THERAPIST

## 2022-08-09 PROCEDURE — 82947 ASSAY GLUCOSE BLOOD QUANT: CPT | Performed by: MASSAGE THERAPIST

## 2022-08-09 RX ORDER — ARIPIPRAZOLE 300 MG
KIT INTRAMUSCULAR
COMMUNITY
Start: 2022-07-13 | End: 2024-04-06

## 2022-08-09 RX ORDER — ARIPIPRAZOLE 15 MG/1
TABLET ORAL
COMMUNITY
Start: 2022-05-31 | End: 2024-04-06

## 2022-08-09 ASSESSMENT — ENCOUNTER SYMPTOMS
PALPITATIONS: 0
JOINT SWELLING: 0
WEAKNESS: 0
DIARRHEA: 0
FREQUENCY: 0
EYE PAIN: 0
ARTHRALGIAS: 0
SORE THROAT: 0
HEADACHES: 0
CONSTIPATION: 0
CHILLS: 0
HEMATOCHEZIA: 0
BREAST MASS: 0
PARESTHESIAS: 0
DIZZINESS: 0
MYALGIAS: 0
HEARTBURN: 0
SHORTNESS OF BREATH: 0
NAUSEA: 0
ABDOMINAL PAIN: 0
FEVER: 0
HEMATURIA: 0
NERVOUS/ANXIOUS: 0
DYSURIA: 0
COUGH: 0

## 2022-08-09 NOTE — PATIENT INSTRUCTIONS
Preventive Health Recommendations  Female Ages 21 to 25     Yearly exam:     See your health care provider every year in order to  o Review health changes.   o Discuss preventive care.    o Review your medicines if your doctor has prescribed any.      You should be tested each year for STDs (sexually transmitted diseases).       Talk to your provider about how often you should have cholesterol testing.      Get a Pap test every three years. If you have an abnormal result, your doctor may have you test more often.      If you are at risk for diabetes, you should have a diabetes test (fasting glucose).     Shots:     Get a flu shot each year.     Get a tetanus shot every 10 years.     Consider getting the shot (vaccine) that prevents cervical cancer (Gardasil).    Nutrition:     Eat at least 5 servings of fruits and vegetables each day.    Eat whole-grain bread, whole-wheat pasta and brown rice instead of white grains and rice.    Get adequate Calcium and Vitamin D.     Lifestyle    Exercise at least 150 minutes a week each week (30 minutes a day, 5 days a week). This will help you control your weight and prevent disease.    Limit alcohol to one drink per day.    No smoking.     Wear sunscreen to prevent skin cancer.    See your dentist every six months for an exam and cleaning.  
Statement Selected

## 2022-08-09 NOTE — PROGRESS NOTES
Last seen by Dr. Holguin 2021 with auditory hallucinations, didn't like Seroquel as it made her too sleepy. Hallucinations predated her pregnancy, but seemed to get worse in the postpartum period.      Note from Dr. Dillard on 21 notes normal pap on 2021, but I don't see documentation of this.  Last documented pap 2017. ,  on 2021 for pre-eclampsia
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
## 2022-08-10 NOTE — PROGRESS NOTES
Preceptor Attestation:  Patient's case reviewed and discussed with Taj Padron MD resident and I evaluated the patient. I agree with written assessment and plan of care.  Supervising Physician:  JAIME PAZ MD  [facility]

## 2023-07-10 ENCOUNTER — PATIENT OUTREACH (OUTPATIENT)
Dept: CARE COORDINATION | Facility: CLINIC | Age: 28
End: 2023-07-10
Payer: COMMERCIAL

## 2023-07-24 ENCOUNTER — PATIENT OUTREACH (OUTPATIENT)
Dept: CARE COORDINATION | Facility: CLINIC | Age: 28
End: 2023-07-24
Payer: COMMERCIAL

## 2024-01-26 ENCOUNTER — OFFICE VISIT (OUTPATIENT)
Dept: FAMILY MEDICINE | Facility: CLINIC | Age: 29
End: 2024-01-26
Payer: COMMERCIAL

## 2024-01-26 VITALS
RESPIRATION RATE: 18 BRPM | HEART RATE: 62 BPM | TEMPERATURE: 98.6 F | OXYGEN SATURATION: 98 % | BODY MASS INDEX: 20.68 KG/M2 | DIASTOLIC BLOOD PRESSURE: 63 MMHG | WEIGHT: 102.4 LBS | SYSTOLIC BLOOD PRESSURE: 96 MMHG

## 2024-01-26 DIAGNOSIS — R20.2 NUMBNESS AND TINGLING OF BOTH UPPER EXTREMITIES WHILE SLEEPING: Primary | ICD-10-CM

## 2024-01-26 DIAGNOSIS — R68.2 DRY MOUTH: ICD-10-CM

## 2024-01-26 DIAGNOSIS — R20.0 NUMBNESS AND TINGLING OF BOTH UPPER EXTREMITIES WHILE SLEEPING: Primary | ICD-10-CM

## 2024-01-26 PROCEDURE — 99213 OFFICE O/P EST LOW 20 MIN: CPT | Performed by: STUDENT IN AN ORGANIZED HEALTH CARE EDUCATION/TRAINING PROGRAM

## 2024-01-26 NOTE — PROGRESS NOTES
Assessment & Plan     Dry mouth  As the dry mouth is mostly at night and in the morning, I discussed that in the winter heated air is typically drier and that she could consider a humidifier to help with her dry mouth symptoms.     Numbness and tingling of both upper extremities while sleeping  Discussed that based on her history and otherwise normal examination that the numbness and tingling is likely due to compression while she is sleeping. She has a relatively new mattress ~5 years old and only experiences the symptoms when lying on her side. I recommended that she consider a mattress topper or trying different pillows to see if this improves her symptoms. We also discussed the use of magnesium to see if it helps with her numbness and tingling.      27 minutes spent by me on the date of the encounter doing chart review, history and exam, documentation and further activities per the note. Billed based on complexity of care.     No follow-ups on file.    Dayana Stephen MD  [facility] MALU Tamayo is a 28 year old female who presents to clinic today for the following health issues:  Chief Complaint   Patient presents with    Musculoskeletal Problem     X 4 months both side of arm,leg and back pain and mouth dry.     HPI    Dry Mouth    Pain in her arm with lying on her side. Has been going on for 3-4 months. Pain is on both sides. Sleeps mostly on her back and when she lies on her side she feels the pain and discomfort and then goes back to her back. Has had her mattress and pillows for over 5 years. Feels tingling and numb pain. Pain is on the side that she is lying down on and can radiate into the back. During the day if she sits down and crosses her legs it feels tingly and she uncrosses her legs.     Review of Systems  Constitutional, HEENT, cardiovascular, pulmonary, gi and gu systems are negative, except as otherwise noted.    History gathered with the help of a Jez  .     Objective    BP 96/63 (BP Location: Left arm, Patient Position: Sitting, Cuff Size: Adult Regular)   Pulse 62   Temp 98.6  F (37  C) (Oral)   Resp 18   Wt 46.4 kg (102 lb 6.4 oz)   LMP 12/23/2023 (Approximate)   SpO2 98%   BMI 20.68 kg/m    Physical Exam   GENERAL: alert and no distress  NECK: no adenopathy, no asymmetry, masses, or scars  RESP: lungs clear to auscultation - no rales, rhonchi or wheezes  CV: regular rate and rhythm, normal S1 S2, no S3 or S4, no murmur, click or rub, no peripheral edema  ABDOMEN: soft, nontender, no hepatosplenomegaly, no masses and bowel sounds normal  MS: no gross musculoskeletal defects noted, no edema  SKIN: no suspicious lesions or rashes  NEURO: Normal strength and tone, mentation intact and speech normal

## 2024-01-26 NOTE — PATIENT INSTRUCTIONS
Magnesium citrate or gluconate are more likely to cause loose stools but are cheaper.  Magnesium glycinate or chelated magnesium are more expensive but less likely to affect your bowel movements.   For sleep try the pillows and mattress pad and see if your symptoms improve.   Increase exercise to help improve your blood flow.     Can consider a humidifier

## 2024-03-13 ENCOUNTER — OFFICE VISIT (OUTPATIENT)
Dept: FAMILY MEDICINE | Facility: CLINIC | Age: 29
End: 2024-03-13
Payer: COMMERCIAL

## 2024-03-13 VITALS
SYSTOLIC BLOOD PRESSURE: 111 MMHG | HEART RATE: 65 BPM | BODY MASS INDEX: 20.32 KG/M2 | TEMPERATURE: 98.4 F | OXYGEN SATURATION: 99 % | DIASTOLIC BLOOD PRESSURE: 71 MMHG | RESPIRATION RATE: 16 BRPM | WEIGHT: 100.6 LBS

## 2024-03-13 DIAGNOSIS — J06.9 VIRAL URI: Primary | ICD-10-CM

## 2024-03-13 PROCEDURE — 99213 OFFICE O/P EST LOW 20 MIN: CPT | Performed by: FAMILY MEDICINE

## 2024-03-13 NOTE — PROGRESS NOTES
Assessment:       Viral URI      Plan:     Symptoms consistent with a viral upper respiratory infection.  Discussed the typical course of symptoms.  Noantibiotics indicated at this time.  Recommend symptomatic treatment such as decongestants and acetominephen or ibuprofen as needed.  Recommend follow up if getting worse or not improving.      Subjective:       28 year old female presents for evaluation 2 to 3-day history of nasal congestion and cough.  Denies ear pain or sore throat.  No shortness of breath or wheezing.  She she has not taken anything for her symptoms.  She has had no fevers or chills.    Patient Active Problem List   Diagnosis    Hallucinations    Postpartum psychosis (H)       No past medical history on file.    Past Surgical History:   Procedure Laterality Date    C/SECTION, CLASSICAL         Current Outpatient Medications   Medication    ABILIFY MAINTENA 300 MG extended release injection    ARIPiprazole (ABILIFY) 15 MG tablet     No current facility-administered medications for this visit.       Allergies   Allergen Reactions    No Known Allergies        Family History   Problem Relation Age of Onset    No Known Problems Mother     No Known Problems Father     No Known Problems Maternal Grandmother     No Known Problems Maternal Grandfather     No Known Problems Paternal Grandmother     No Known Problems Paternal Grandfather     No Known Problems Brother     No Known Problems Sister     No Known Problems Son     No Known Problems Daughter     No Known Problems Maternal Half-Brother     No Known Problems Maternal Half-Sister     No Known Problems Paternal Half-Brother     No Known Problems Paternal Half-Sister     No Known Problems Niece     No Known Problems Nephew     No Known Problems Cousin     No Known Problems Other     Cancer No family hx of     Diabetes No family hx of     Coronary Artery Disease No family hx of     Heart Disease No family hx of     Hypertension No family hx of      Hyperlipidemia No family hx of     Kidney Disease No family hx of     Cerebrovascular Disease No family hx of     Obesity No family hx of     Thrombosis No family hx of     Asthma No family hx of     Arthritis No family hx of     Thyroid Disease No family hx of     Depression No family hx of     Mental Illness No family hx of     Substance Abuse No family hx of     Cystic Fibrosis No family hx of     Early Death No family hx of     Coronary Artery Disease Early Onset No family hx of     Heart Failure No family hx of     Bleeding Diathesis No family hx of     Dementia No family hx of     Breast Cancer No family hx of     Ovarian Cancer No family hx of     Uterine Cancer No family hx of     Prostate Cancer No family hx of     Colorectal Cancer No family hx of     Pancreatic Cancer No family hx of     Lung Cancer No family hx of     Melanoma No family hx of     Autoimmune Disease No family hx of     Unknown/Adopted No family hx of     Genetic Disorder No family hx of        Social History     Socioeconomic History    Marital status:      Spouse name: None    Number of children: None    Years of education: None    Highest education level: None   Tobacco Use    Smoking status: Never    Smokeless tobacco: Never   Substance and Sexual Activity    Alcohol use: Never    Drug use: Never    Sexual activity: Yes     Partners: Male       Review of Systems  Pertinent items are noted in HPI.      Objective:                   General Appearance:    /71   Pulse 65   Temp 98.4  F (36.9  C) (Oral)   Resp 16   Wt 45.6 kg (100 lb 9.6 oz)   LMP  (LMP Unknown)   SpO2 99%   BMI 20.32 kg/m          Alert, pleasant, cooperative, no distress, appears stated age   Head:    Normocephalic, without obvious abnormality, atraumatic   Eyes:    Conjunctiva/corneas clear   Ears:    Normal TM's without erythema or bulging. Normal external ear canals, both ears   Nose:   Nares normal, septum midline, mucosa normal, no drainage    or  sinus tenderness   Throat:   Lips, mucosa, and tongue normal; teeth and gums normal.  No tonsilar hypertrophy or exudate.   Neck:   Supple, symmetrical, trachea midline, no adenopathy    Lungs:     Clear to auscultation bilaterally without wheezes, rales, or rhonchi, respirations unlabored    Heart:    Regular rate and rhythm, S1 and S2 normal, no murmur, rub or gallop       Extremities:   Extremities normal, atraumatic, no cyanosis or edema   Skin:   Skin color, texture, turgor normal, no rashes or lesions         This note has been dictated using voice recognition software. Any grammatical or context distortions are unintentional and inherent to the software

## 2024-04-04 ENCOUNTER — APPOINTMENT (OUTPATIENT)
Dept: INTERPRETER SERVICES | Facility: CLINIC | Age: 29
End: 2024-04-04
Payer: COMMERCIAL

## 2024-04-06 ENCOUNTER — OFFICE VISIT (OUTPATIENT)
Dept: FAMILY MEDICINE | Facility: CLINIC | Age: 29
End: 2024-04-06
Payer: COMMERCIAL

## 2024-04-06 VITALS
DIASTOLIC BLOOD PRESSURE: 70 MMHG | HEART RATE: 76 BPM | BODY MASS INDEX: 20.32 KG/M2 | OXYGEN SATURATION: 97 % | TEMPERATURE: 97.6 F | SYSTOLIC BLOOD PRESSURE: 109 MMHG | WEIGHT: 100.6 LBS | RESPIRATION RATE: 16 BRPM

## 2024-04-06 DIAGNOSIS — R10.84 ABDOMINAL PAIN, GENERALIZED: Primary | ICD-10-CM

## 2024-04-06 DIAGNOSIS — M35.00 SICCA, UNSPECIFIED TYPE (H): ICD-10-CM

## 2024-04-06 PROCEDURE — 99213 OFFICE O/P EST LOW 20 MIN: CPT | Performed by: FAMILY MEDICINE

## 2024-04-06 ASSESSMENT — ENCOUNTER SYMPTOMS
HEMATOLOGIC/LYMPHATIC NEGATIVE: 1
MUSCULOSKELETAL NEGATIVE: 1
CONSTITUTIONAL NEGATIVE: 1
ALLERGIC/IMMUNOLOGIC NEGATIVE: 1
EYES NEGATIVE: 1
NEUROLOGICAL NEGATIVE: 1
RESPIRATORY NEGATIVE: 1
CARDIOVASCULAR NEGATIVE: 1
ENDOCRINE NEGATIVE: 1
PSYCHIATRIC NEGATIVE: 1
ABDOMINAL PAIN: 1

## 2024-04-06 NOTE — PROGRESS NOTES
SUBJECTIVE:   Belkis Sanchez is a 28 year old female presenting with a chief complaint of   Chief Complaint   Patient presents with    Abdominal Pain     Abdominal pain since c section. Kind of firm Rt side abdomin.    Throat Pain     Dry throat when sleeping on either side so pt has to sleep supine.       She is an established patient of [facility].    Abdominal Pain    Location: Right sided around the  scar ongoing for over two years  Radiation: None.    Pain character: itching,   Severity: Patient says the pain fluctuates  on a scale of 1-10.    Duration: 1 year(s)   Course of Illness: fluctuating.  Exacerbated by: nothing  Relieved by: nothing.  Associated Symptoms: none.  Female : Not applicable  Surgical History:     Patient is a 28-year-old presenting with abdominal pain has been ongoing for over 2 years.  She reports that the pain comes and goes and when she has the pain into her dull ache and there is some itching around the pain.  She had a  and the pain is around the  scar.  She denies any nausea or vomiting.  She has had no change in her bowel movements.  No other systemic symptoms associated with the abdominal pain.      She also reports that she has very dry mouth when she sleeps she has to sleep supine because of the dry mouth.  She reports no history of reflux disease.  She is not on any routine medications.  Denies any sore throat or difficulty swallowing.  No history of sleep apnea.  She states she does not think she sleeps with her mouth open.  No other associated symptoms with the dry mouth.      Review of Systems   Constitutional: Negative.    Eyes: Negative.    Respiratory: Negative.     Cardiovascular: Negative.    Gastrointestinal:  Positive for abdominal pain.   Endocrine: Negative.    Genitourinary: Negative.    Musculoskeletal: Negative.    Skin: Negative.    Allergic/Immunologic: Negative.    Neurological: Negative.    Hematological: Negative.     Psychiatric/Behavioral: Negative.         History reviewed. No pertinent past medical history.  Family History   Problem Relation Age of Onset    No Known Problems Mother     No Known Problems Father     No Known Problems Maternal Grandmother     No Known Problems Maternal Grandfather     No Known Problems Paternal Grandmother     No Known Problems Paternal Grandfather     No Known Problems Brother     No Known Problems Sister     No Known Problems Son     No Known Problems Daughter     No Known Problems Maternal Half-Brother     No Known Problems Maternal Half-Sister     No Known Problems Paternal Half-Brother     No Known Problems Paternal Half-Sister     No Known Problems Niece     No Known Problems Nephew     No Known Problems Cousin     No Known Problems Other     Cancer No family hx of     Diabetes No family hx of     Coronary Artery Disease No family hx of     Heart Disease No family hx of     Hypertension No family hx of     Hyperlipidemia No family hx of     Kidney Disease No family hx of     Cerebrovascular Disease No family hx of     Obesity No family hx of     Thrombosis No family hx of     Asthma No family hx of     Arthritis No family hx of     Thyroid Disease No family hx of     Depression No family hx of     Mental Illness No family hx of     Substance Abuse No family hx of     Cystic Fibrosis No family hx of     Early Death No family hx of     Coronary Artery Disease Early Onset No family hx of     Heart Failure No family hx of     Bleeding Diathesis No family hx of     Dementia No family hx of     Breast Cancer No family hx of     Ovarian Cancer No family hx of     Uterine Cancer No family hx of     Prostate Cancer No family hx of     Colorectal Cancer No family hx of     Pancreatic Cancer No family hx of     Lung Cancer No family hx of     Melanoma No family hx of     Autoimmune Disease No family hx of     Unknown/Adopted No family hx of     Genetic Disorder No family hx of      No current  outpatient medications on file.     Social History     Tobacco Use    Smoking status: Never    Smokeless tobacco: Never   Substance Use Topics    Alcohol use: Never       OBJECTIVE  /70   Pulse 76   Temp 97.6  F (36.4  C) (Oral)   Resp 16   Wt 45.6 kg (100 lb 9.6 oz)   LMP  (LMP Unknown)   SpO2 97%   BMI 20.32 kg/m      Physical Exam  Constitutional:       Appearance: Normal appearance.   HENT:      Head: Normocephalic and atraumatic.   Cardiovascular:      Rate and Rhythm: Normal rate and regular rhythm.      Pulses: Normal pulses.      Heart sounds: Normal heart sounds.   Pulmonary:      Effort: Pulmonary effort is normal. No respiratory distress.      Breath sounds: No stridor. No wheezing or rhonchi.   Chest:      Chest wall: No tenderness.   Abdominal:      General: Abdomen is flat. Bowel sounds are normal. There is no distension.      Palpations: Abdomen is soft. There is mass.      Tenderness: There is no abdominal tenderness. There is no right CVA tenderness, left CVA tenderness, guarding or rebound.      Hernia: No hernia is present.      Comments: Lump around scar tissue of , feels like a cyst   Musculoskeletal:         General: Normal range of motion.      Cervical back: Normal range of motion and neck supple.   Skin:     General: Skin is warm and dry.   Neurological:      General: No focal deficit present.      Mental Status: She is alert and oriented to person, place, and time.         Labs:  No results found for this or any previous visit (from the past 24 hour(s)).    X-Ray was not done.    ASSESSMENT:      ICD-10-CM    1. Abdominal pain, generalized  R10.84       2. Sicca, unspecified type (H24)  M35.00          Patient was reassured, no signs of acute abdomen. This abdominal pain has been ongoing for over two years.  I felt a lump around the  scar. Soft movable.  Recommend ultrasound outpatient.  For the dry mouth - recommend Biotene.   Medical Decision  Making:    Differential Diagnosis:  Abdominal Pain: Non Specific    Serious Comorbid Conditions:  Adult:  None    PLAN:    ABD Pain:  Tylenol, Ibuprofen, and watch. May need an ultrasound    Followup:    If not improving or if condition worsens, follow up with your Primary Care Provider    There are no Patient Instructions on file for this visit.

## 2024-04-11 ENCOUNTER — APPOINTMENT (OUTPATIENT)
Dept: ULTRASOUND IMAGING | Facility: HOSPITAL | Age: 29
End: 2024-04-11
Attending: EMERGENCY MEDICINE
Payer: COMMERCIAL

## 2024-04-11 ENCOUNTER — PATIENT OUTREACH (OUTPATIENT)
Dept: CARE COORDINATION | Facility: CLINIC | Age: 29
End: 2024-04-11

## 2024-04-11 ENCOUNTER — HOSPITAL ENCOUNTER (EMERGENCY)
Facility: HOSPITAL | Age: 29
Discharge: HOME | End: 2024-04-11
Attending: EMERGENCY MEDICINE | Admitting: EMERGENCY MEDICINE
Payer: COMMERCIAL

## 2024-04-11 VITALS
RESPIRATION RATE: 18 BRPM | OXYGEN SATURATION: 98 % | DIASTOLIC BLOOD PRESSURE: 68 MMHG | HEIGHT: 59 IN | HEART RATE: 63 BPM | SYSTOLIC BLOOD PRESSURE: 106 MMHG | WEIGHT: 100 LBS | TEMPERATURE: 97.1 F | BODY MASS INDEX: 20.16 KG/M2

## 2024-04-11 DIAGNOSIS — K80.20 SYMPTOMATIC CHOLELITHIASIS: ICD-10-CM

## 2024-04-11 LAB
ALBUMIN SERPL BCG-MCNC: 4.3 G/DL (ref 3.5–5.2)
ALP SERPL-CCNC: 63 U/L (ref 40–150)
ALT SERPL W P-5'-P-CCNC: 9 U/L (ref 0–50)
ANION GAP SERPL CALCULATED.3IONS-SCNC: 11 MMOL/L (ref 7–15)
AST SERPL W P-5'-P-CCNC: 17 U/L (ref 0–45)
BASOPHILS # BLD AUTO: 0.1 10E3/UL (ref 0–0.2)
BASOPHILS NFR BLD AUTO: 0 %
BILIRUB SERPL-MCNC: 0.8 MG/DL
BUN SERPL-MCNC: 19.3 MG/DL (ref 6–20)
CALCIUM SERPL-MCNC: 9.1 MG/DL (ref 8.6–10)
CHLORIDE SERPL-SCNC: 100 MMOL/L (ref 98–107)
CREAT SERPL-MCNC: 0.59 MG/DL (ref 0.51–0.95)
CRP SERPL-MCNC: <3 MG/L
DEPRECATED HCO3 PLAS-SCNC: 24 MMOL/L (ref 22–29)
EGFRCR SERPLBLD CKD-EPI 2021: >90 ML/MIN/1.73M2
EOSINOPHIL # BLD AUTO: 0.2 10E3/UL (ref 0–0.7)
EOSINOPHIL NFR BLD AUTO: 2 %
ERYTHROCYTE [DISTWIDTH] IN BLOOD BY AUTOMATED COUNT: 11.9 % (ref 10–15)
GLUCOSE SERPL-MCNC: 103 MG/DL (ref 70–99)
HCG SERPL QL: NEGATIVE
HCT VFR BLD AUTO: 42 % (ref 35–47)
HGB BLD-MCNC: 13.7 G/DL (ref 11.7–15.7)
HOLD SPECIMEN: NORMAL
IMM GRANULOCYTES # BLD: 0.1 10E3/UL
IMM GRANULOCYTES NFR BLD: 0 %
LIPASE SERPL-CCNC: 42 U/L (ref 13–60)
LYMPHOCYTES # BLD AUTO: 2.4 10E3/UL (ref 0.8–5.3)
LYMPHOCYTES NFR BLD AUTO: 17 %
MCH RBC QN AUTO: 30 PG (ref 26.5–33)
MCHC RBC AUTO-ENTMCNC: 32.6 G/DL (ref 31.5–36.5)
MCV RBC AUTO: 92 FL (ref 78–100)
MONOCYTES # BLD AUTO: 0.9 10E3/UL (ref 0–1.3)
MONOCYTES NFR BLD AUTO: 6 %
NEUTROPHILS # BLD AUTO: 10.6 10E3/UL (ref 1.6–8.3)
NEUTROPHILS NFR BLD AUTO: 75 %
NRBC # BLD AUTO: 0 10E3/UL
NRBC BLD AUTO-RTO: 0 /100
PLATELET # BLD AUTO: 259 10E3/UL (ref 150–450)
POTASSIUM SERPL-SCNC: 3.5 MMOL/L (ref 3.4–5.3)
PROT SERPL-MCNC: 7.9 G/DL (ref 6.4–8.3)
RBC # BLD AUTO: 4.56 10E6/UL (ref 3.8–5.2)
SODIUM SERPL-SCNC: 135 MMOL/L (ref 135–145)
WBC # BLD AUTO: 14.2 10E3/UL (ref 4–11)

## 2024-04-11 PROCEDURE — 99285 EMERGENCY DEPT VISIT HI MDM: CPT | Mod: 25

## 2024-04-11 PROCEDURE — 83690 ASSAY OF LIPASE: CPT | Performed by: EMERGENCY MEDICINE

## 2024-04-11 PROCEDURE — 93005 ELECTROCARDIOGRAM TRACING: CPT | Performed by: EMERGENCY MEDICINE

## 2024-04-11 PROCEDURE — 36415 COLL VENOUS BLD VENIPUNCTURE: CPT | Performed by: STUDENT IN AN ORGANIZED HEALTH CARE EDUCATION/TRAINING PROGRAM

## 2024-04-11 PROCEDURE — 85025 COMPLETE CBC W/AUTO DIFF WBC: CPT | Performed by: EMERGENCY MEDICINE

## 2024-04-11 PROCEDURE — 96374 THER/PROPH/DIAG INJ IV PUSH: CPT

## 2024-04-11 PROCEDURE — 86140 C-REACTIVE PROTEIN: CPT | Performed by: EMERGENCY MEDICINE

## 2024-04-11 PROCEDURE — 80053 COMPREHEN METABOLIC PANEL: CPT | Performed by: EMERGENCY MEDICINE

## 2024-04-11 PROCEDURE — 76705 ECHO EXAM OF ABDOMEN: CPT

## 2024-04-11 PROCEDURE — 250N000011 HC RX IP 250 OP 636: Performed by: EMERGENCY MEDICINE

## 2024-04-11 PROCEDURE — 84703 CHORIONIC GONADOTROPIN ASSAY: CPT | Performed by: EMERGENCY MEDICINE

## 2024-04-11 RX ORDER — KETOROLAC TROMETHAMINE 10 MG/1
10 TABLET, FILM COATED ORAL EVERY 6 HOURS PRN
Qty: 20 TABLET | Refills: 0 | Status: SHIPPED | OUTPATIENT
Start: 2024-04-11 | End: 2024-06-21

## 2024-04-11 RX ORDER — KETOROLAC TROMETHAMINE 15 MG/ML
15 INJECTION, SOLUTION INTRAMUSCULAR; INTRAVENOUS ONCE
Status: COMPLETED | OUTPATIENT
Start: 2024-04-11 | End: 2024-04-11

## 2024-04-11 RX ORDER — ONDANSETRON 4 MG/1
4 TABLET, ORALLY DISINTEGRATING ORAL EVERY 6 HOURS PRN
Qty: 10 TABLET | Refills: 0 | Status: SHIPPED | OUTPATIENT
Start: 2024-04-11 | End: 2024-04-15

## 2024-04-11 RX ORDER — OXYCODONE HYDROCHLORIDE 5 MG/1
5 TABLET ORAL EVERY 6 HOURS PRN
Qty: 6 TABLET | Refills: 0 | Status: SHIPPED | OUTPATIENT
Start: 2024-04-11 | End: 2024-04-15

## 2024-04-11 RX ADMIN — KETOROLAC TROMETHAMINE 15 MG: 15 INJECTION, SOLUTION INTRAMUSCULAR; INTRAVENOUS at 03:16

## 2024-04-11 ASSESSMENT — COLUMBIA-SUICIDE SEVERITY RATING SCALE - C-SSRS
6. HAVE YOU EVER DONE ANYTHING, STARTED TO DO ANYTHING, OR PREPARED TO DO ANYTHING TO END YOUR LIFE?: NO
1. IN THE PAST MONTH, HAVE YOU WISHED YOU WERE DEAD OR WISHED YOU COULD GO TO SLEEP AND NOT WAKE UP?: NO
2. HAVE YOU ACTUALLY HAD ANY THOUGHTS OF KILLING YOURSELF IN THE PAST MONTH?: NO

## 2024-04-11 ASSESSMENT — ENCOUNTER SYMPTOMS
FEVER: 0
DYSURIA: 0
ABDOMINAL PAIN: 1
FREQUENCY: 0
DIARRHEA: 0
BACK PAIN: 1
FLANK PAIN: 1
VOMITING: 0

## 2024-04-11 ASSESSMENT — ACTIVITIES OF DAILY LIVING (ADL)
ADLS_ACUITY_SCORE: 35
ADLS_ACUITY_SCORE: 33

## 2024-04-11 NOTE — PROGRESS NOTES
Clinic Care Coordination Contact  Follow Up Progress Note      Assessment: The pt was recently in the ED, I called to check up on the pt and help the pt setup a ED follow up. The pt was at [facility] for chest pain.I called and talked to the pt, pt stated that she is doing better. Pt stated that she would like a follow up, but not for the ED follow up. I was able to setup for the pt to come in on 04/15/2024 at 9:20am with .     Care Gaps:    Health Maintenance Due   Topic Date Due    HEPATITIS C SCREENING  Never done    PAP  Never done    YEARLY PREVENTIVE VISIT  08/09/2023    PHQ-2 (once per calendar year)  01/01/2024    HEPATITIS B IMMUNIZATION (2 of 2 - CpG 2-dose series) 04/17/2024           Care Plans      Intervention/Education provided during outreach:               Plan:     Care Coordinator will follow up in

## 2024-04-11 NOTE — ED PROVIDER NOTES
Emergency Department Encounter      NAME: Belkis Sanchez  AGE: 28 year old female  YOB: 1995  MRN: [id number]  EVALUATION DATE & TIME: 4/11/2024  2:56 AM    PCP: Taj Padron    ED PROVIDER: Enmanuel Thomas M.D.      Chief Complaint   Patient presents with    Chest Pain         FINAL IMPRESSION:  1. Symptomatic cholelithiasis        MEDICAL DECISION MAKING:    3:02 AM I met with the patient, obtained history, performed an initial exam, and discussed options and plan for diagnostics and treatment here in the ED.     This patient is a 28-year-old female who is brought to the ER by her family because of right upper quadrant abdominal pain.  In triage it was thought that she had chest pain so an EKG was done initially.  It showed incomplete right bundle branch block but no sign of MI.  I independently reviewed and interpreted the EKG.  On my exam though she had right upper quadrant tenderness.  The patient's pain began shortly after she had had a fruit drink in the evening around 8 PM.  The pain radiated to her back.  She had no other associated symptoms and had no prior similar symptoms.  The patient was given Toradol in the ER and her symptoms resolved.  Her white blood cell count was slightly elevated at 14.2 but otherwise her workup was negative with a normal lipase and hepatic profile.  She had a normal CRP, hCG, electrolytes and renal function.  A right upper quadrant abdominal ultrasound was done which showed gallstones and gallbladder distention but had a negative sonographic Tang sign.  There was a 8 mm stone in the neck of the gallbladder or in the cystic duct.  There was no biliary distention.  I independently reviewed and interpreted the ultrasound.  I reviewed the patient's medical records from 6 April when she was seen in the family practice clinic at [place] by Dr. Martinez.  At that time she told the doctor that the abdominal pain had been going on for 2 years.  No workup was done at that  time although a ultrasound was considered.  I reviewed the results with the patient and her family.  She was feeling completely pain-free so although I considered admitting her I thought it was also okay for her to be worked up as an outpatient.  We discussed that she should return if she develops a fever or has pain that does not resolve within an hour or 2.  I am going to refer her to the general surgeons to evaluate her for any elective cholecystectomy.      Pertinent Labs & Imaging studies reviewed. (See chart for details)    The importance of close follow up was discussed. We reviewed warning signs and symptoms, and I instructed Ms. Sanchez to return to the emergency department immediately if she develops any new or worsening symptoms. I provided additional verbal discharge instructions. Ms. Sanchez expressed understanding and agreement with this plan of care, her questions were answered, and she was discharged in stable condition.     Medical Decision Making     History:  Supplemental history from: Documented in chart, if applicable  External Record(s) reviewed: Documented in chart, if applicable.     Work Up:  Chart documentation includes differential considered and any EKGs or imaging independently interpreted by provider, where specified.  In additional to work up documented, I considered the following work up: Documented in chart, if applicable.     External consultation:  Discussion of management with another provider: Documented in chart, if applicable     Complicating factors:  Care impacted by chronic illness: None  Care affected by social determinants of health: Access to Medical Care     Disposition considerations: Discharged with prescription for Toradol, Zofran and oxycodone      MEDICATIONS GIVEN IN THE EMERGENCY:  Medications   ketorolac (TORADOL) injection 15 mg (15 mg Intravenous $Given 4/11/24 0316)       NEW PRESCRIPTIONS STARTED AT TODAY'S ER VISIT:  Discharge Medication List as of 4/11/2024  5:48  AM        START taking these medications    Details   ketorolac (TORADOL) 10 MG tablet Take 1 tablet (10 mg) by mouth every 6 hours as needed for moderate pain, Disp-20 tablet, R-0, Local Print      ondansetron (ZOFRAN ODT) 4 MG ODT tab Take 1 tablet (4 mg) by mouth every 6 hours as needed for nausea, Disp-10 tablet, R-0, Local Print      oxyCODONE (ROXICODONE) 5 MG tablet Take 1 tablet (5 mg) by mouth every 6 hours as needed for pain, Disp-6 tablet, R-0, Local Print                =================================================================    HPI    Patient information was obtained from: patient     Use of : Yes (In Person) - Language Jez Sanchez is a 28 year old female with no pertinent past medical history, who presents via walk-in for evaluation of abdominal pain.    The patient presents with center epigastric abdominal pain at a 7/10 severity that radiates to her right side and back that began around 2000 last night (4/9) after drinking a [employer] dragon fruit refresher. Patient's last menstrual period was around 1 month ago.    Patient denies medical problems or daily medications, history of similar symptoms, fever, diarrhea, vomiting, dysuria, urinary frequency, change in urine color, and any other symptoms or complaints at this time.     Patient's family members served as in-person [facility] interpreters.      REVIEW OF SYSTEMS   Review of Systems   Constitutional:  Negative for fever.   Gastrointestinal:  Positive for abdominal pain. Negative for diarrhea and vomiting.   Genitourinary:  Positive for flank pain (right). Negative for dysuria and frequency.   Musculoskeletal:  Positive for back pain (right).        PAST MEDICAL HISTORY:  History reviewed. No pertinent past medical history.    PAST SURGICAL HISTORY:  Past Surgical History:   Procedure Laterality Date    C/SECTION, CLASSICAL         CURRENT MEDICATIONS:    No current facility-administered medications for this  encounter.    Current Outpatient Medications:     ketorolac (TORADOL) 10 MG tablet, Take 1 tablet (10 mg) by mouth every 6 hours as needed for moderate pain, Disp: 20 tablet, Rfl: 0    ondansetron (ZOFRAN ODT) 4 MG ODT tab, Take 1 tablet (4 mg) by mouth every 6 hours as needed for nausea, Disp: 10 tablet, Rfl: 0    oxyCODONE (ROXICODONE) 5 MG tablet, Take 1 tablet (5 mg) by mouth every 6 hours as needed for pain, Disp: 6 tablet, Rfl: 0    ALLERGIES:  Allergies   Allergen Reactions    No Known Allergies        FAMILY HISTORY:  Family History   Problem Relation Age of Onset    No Known Problems Mother     No Known Problems Father     No Known Problems Maternal Grandmother     No Known Problems Maternal Grandfather     No Known Problems Paternal Grandmother     No Known Problems Paternal Grandfather     No Known Problems Brother     No Known Problems Sister     No Known Problems Son     No Known Problems Daughter     No Known Problems Maternal Half-Brother     No Known Problems Maternal Half-Sister     No Known Problems Paternal Half-Brother     No Known Problems Paternal Half-Sister     No Known Problems Niece     No Known Problems Nephew     No Known Problems Cousin     No Known Problems Other     Cancer No family hx of     Diabetes No family hx of     Coronary Artery Disease No family hx of     Heart Disease No family hx of     Hypertension No family hx of     Hyperlipidemia No family hx of     Kidney Disease No family hx of     Cerebrovascular Disease No family hx of     Obesity No family hx of     Thrombosis No family hx of     Asthma No family hx of     Arthritis No family hx of     Thyroid Disease No family hx of     Depression No family hx of     Mental Illness No family hx of     Substance Abuse No family hx of     Cystic Fibrosis No family hx of     Early Death No family hx of     Coronary Artery Disease Early Onset No family hx of     Heart Failure No family hx of     Bleeding Diathesis No family hx of      "Dementia No family hx of     Breast Cancer No family hx of     Ovarian Cancer No family hx of     Uterine Cancer No family hx of     Prostate Cancer No family hx of     Colorectal Cancer No family hx of     Pancreatic Cancer No family hx of     Lung Cancer No family hx of     Melanoma No family hx of     Autoimmune Disease No family hx of     Unknown/Adopted No family hx of     Genetic Disorder No family hx of        SOCIAL HISTORY:   Social History     Socioeconomic History    Marital status:    Tobacco Use    Smoking status: Never    Smokeless tobacco: Never   Substance and Sexual Activity    Alcohol use: Never    Drug use: Never    Sexual activity: Yes     Partners: Male       PHYSICAL EXAM:    Vitals: /68   Pulse 63   Temp 97.1  F (36.2  C) (Temporal)   Resp 18   Ht 1.499 m (4' 11\")   Wt 45.4 kg (100 lb)   LMP  (LMP Unknown)   SpO2 98%   BMI 20.20 kg/m     Constitutional: Thin  female who appears in mild distress.  Her family is present and acted as the .   HEAD:Normocephalic, atraumatic,   Eyes: PERRLA, no scleral icterus noted, conjunctiva clear, no discharge  ENT: mucous membranes moist, nose normal.   Neck- Supple, gross ROM intact.  No JVD.  No palpable nodes.  Pulmonary: Clear to auscultation bilaterally, no respiratory distress, no wheezing, speaks full sentences easily.  Chest: No chest wall tenderness  Cardiovascular: Normal heart rate, regular rhythm, no murmurs. No lower extremity edema, 2+ DP pulses.   GI: Soft, not distended, no masses.  No hepatosplenomegaly. Tenderness to RUQ on palpation.  Musculoskeletal: Moving all 4 extremities intentionally and without pain. No obvious deformity.   Back: Possible right CVA tenderness.  Skin: Warm, dry, no rash.  Neurologic: Alert & oriented x 3, speech clear, moving all extremities spontaneously   Psychiatric: Affect normal, cooperative.     LAB:  All pertinent labs reviewed and interpreted.  Labs Ordered and Resulted from " Time of ED Arrival to Time of ED Departure   COMPREHENSIVE METABOLIC PANEL - Abnormal       Result Value    Sodium 135      Potassium 3.5      Carbon Dioxide (CO2) 24      Anion Gap 11      Urea Nitrogen 19.3      Creatinine 0.59      GFR Estimate >90      Calcium 9.1      Chloride 100      Glucose 103 (*)     Alkaline Phosphatase 63      AST 17      ALT 9      Protein Total 7.9      Albumin 4.3      Bilirubin Total 0.8     CBC WITH PLATELETS AND DIFFERENTIAL - Abnormal    WBC Count 14.2 (*)     RBC Count 4.56      Hemoglobin 13.7      Hematocrit 42.0      MCV 92      MCH 30.0      MCHC 32.6      RDW 11.9      Platelet Count 259      % Neutrophils 75      % Lymphocytes 17      % Monocytes 6      % Eosinophils 2      % Basophils 0      % Immature Granulocytes 0      NRBCs per 100 WBC 0      Absolute Neutrophils 10.6 (*)     Absolute Lymphocytes 2.4      Absolute Monocytes 0.9      Absolute Eosinophils 0.2      Absolute Basophils 0.1      Absolute Immature Granulocytes 0.1      Absolute NRBCs 0.0     LIPASE - Normal    Lipase 42     CRP INFLAMMATION - Normal    CRP Inflammation <3.00     HCG QUALITATIVE PREGNANCY - Normal    hCG Serum Qualitative Negative         RADIOLOGY:  US Abdomen Limited (RUQ)   Final Result   IMPRESSION:   1.  Cholelithiasis and gallbladder distention. Sonographic Tang's sign negative.   2.  8 mm stone neck of the gallbladder versus cystic duct.   3.  No biliary dilatation.                EKG:   Performed at: 03:03:40  Impression: Incomplete right bundle branch block.  Rate: 65  Rhythm: Sinus  QRS Interval: 96  QTc Interval: 422  Comparison: N/A  I have independently reviewed and interpreted the EKG(s) documented above.     PROCEDURES:   Procedures       IYen, am serving as a scribe to document services personally performed by Dr. Enmanuel Thomas based on my observation and the provider's statements to me. Enmanuel JASMINE M.D. attest that Yen Rueda is acting in a  dipika roberts, has observed my performance of the services and has documented them in accordance with my direction.      Enmanuel Thomas M.D.  Emergency Medicine  [facility] EMERGENCY DEPARTMENT  [address] [place] [id number] [phone]  Dept: [phone]      Enmanuel Thomas MD  04/12/24 0412

## 2024-04-11 NOTE — ED TRIAGE NOTES
Belkis presents to triage with her sister and sister's boyfriend. Belkis comes in with chest pain. Chest pain the located in the lower medial chest that radiates to the right lower chest and to the back. Pain started at 8pm last night after drinking a [employer] drink.      Triage Assessment (Adult)       Row Name 04/11/24 0251          Triage Assessment    Airway WDL WDL        Respiratory WDL    Respiratory WDL WDL        Skin Circulation/Temperature WDL    Skin Circulation/Temperature WDL WDL        Cardiac WDL    Cardiac WDL X;chest pain        Chest Pain Assessment    Duration since 8pm yesterday        Peripheral/Neurovascular WDL    Peripheral Neurovascular WDL WDL        Cognitive/Neuro/Behavioral WDL    Cognitive/Neuro/Behavioral WDL WDL

## 2024-04-12 ENCOUNTER — APPOINTMENT (OUTPATIENT)
Dept: INTERPRETER SERVICES | Facility: CLINIC | Age: 29
End: 2024-04-12
Payer: COMMERCIAL

## 2024-04-15 ENCOUNTER — OFFICE VISIT (OUTPATIENT)
Dept: FAMILY MEDICINE | Facility: CLINIC | Age: 29
End: 2024-04-15
Payer: COMMERCIAL

## 2024-04-15 VITALS
DIASTOLIC BLOOD PRESSURE: 69 MMHG | WEIGHT: 102 LBS | BODY MASS INDEX: 20.56 KG/M2 | SYSTOLIC BLOOD PRESSURE: 104 MMHG | OXYGEN SATURATION: 99 % | TEMPERATURE: 98.1 F | HEIGHT: 59 IN | HEART RATE: 65 BPM | RESPIRATION RATE: 16 BRPM

## 2024-04-15 DIAGNOSIS — K80.20 SYMPTOMATIC CHOLELITHIASIS: ICD-10-CM

## 2024-04-15 DIAGNOSIS — H57.89 EYE IRRITATION: ICD-10-CM

## 2024-04-15 DIAGNOSIS — K43.2 INCISIONAL HERNIA, WITHOUT OBSTRUCTION OR GANGRENE: Primary | ICD-10-CM

## 2024-04-15 DIAGNOSIS — J39.2 THROAT IRRITATION: ICD-10-CM

## 2024-04-15 LAB
ATRIAL RATE - MUSE: 65 BPM
DIASTOLIC BLOOD PRESSURE - MUSE: NORMAL MMHG
INTERPRETATION ECG - MUSE: NORMAL
P AXIS - MUSE: 76 DEGREES
PR INTERVAL - MUSE: 146 MS
QRS DURATION - MUSE: 96 MS
QT - MUSE: 406 MS
QTC - MUSE: 422 MS
R AXIS - MUSE: 72 DEGREES
SYSTOLIC BLOOD PRESSURE - MUSE: NORMAL MMHG
T AXIS - MUSE: 57 DEGREES
VENTRICULAR RATE- MUSE: 65 BPM

## 2024-04-15 PROCEDURE — 99214 OFFICE O/P EST MOD 30 MIN: CPT | Performed by: FAMILY MEDICINE

## 2024-04-15 RX ORDER — FLUTICASONE PROPIONATE 50 MCG
1 SPRAY, SUSPENSION (ML) NASAL DAILY
Qty: 9.9 ML | Refills: 2 | Status: SHIPPED | OUTPATIENT
Start: 2024-04-15 | End: 2024-06-21

## 2024-04-15 NOTE — PROGRESS NOTES
Assessment & Plan     Incisional hernia, without obstruction or gangrene  Mildly symptomatic. Desires future children, so will need to be mindful of management regarding future operative delivery needs. Seeing gen surg tomorrow for cholelithiasis. May need a separate appointment to discuss management of this issue.  - Adult General Surg Referral; Future    Throat irritation  Eye irritation  Suspected postnasal drip and environmental allergies given sx and posterior pharyngeal cobblestoning. Given hx x 3-4 months, dust allergy is likely.  - Reviewed likely cause of symptoms.  - Trial of fluticasone (FLONASE) 50 MCG/ACT nasal spray; Spray 1 spray into both nostrils daily    Symptomatic cholelithiasis  Evaluated in the ED on  for this. No pain at present. No signs of infection.   - Surgery follow up scheduled for tomorrow.    I spent a total of 36 minutes on the day of the visit.   Time spent by me doing chart review, history and exam, documentation and further activities per the note      Follow up in 4-6 weeks to review eye and throat irritation symptoms and to discuss urinary issues.     Alexis Tamayo is a 28 year old, presenting for the following health issues:  Wound Check (Itchy and hard x1 yr) and sleep problems (Dry throat x3-4 mo)        4/15/2024     9:15 AM   Additional Questions   Roomed by Donya         4/15/2024    Information    services provided? Yes   Language Hmong   Type of interpretation provided Face-to-face    name Ciara Allison    [employer]     She had her first baby through  section in . It feels hard under the incision site. She felt that it was healing normally early on after her surgery. She noticed this about a year ago for the first time. This is true along the entire area of the scar. No associated redness or drainage.   Sometimes it feels itchy and tingling around the area along the entire incision. She denies any  "pain deep under the scar unless she applies some pressure and then it is only mild irritation. She hasn't noticed a pattern with when it bothers her such as rubbing on clothes or carrying heavy objects. No bulging. She hasn't noticed an association with her menses. Her menses have been quite irregular. She is moving her bowels normally. Hasn't tried any topical products to the area. She hasn't had another baby since her initial  section. Some urinary urgency and frequency for several years since having her first child.     She is also experiencing a dry throat. She noticed that if she sleeps on her side, her throat is drier. No coughing. Normal breathing. No issues with acid reflux. No sneezing. Some nasal congestion and dripping down her throat in the morning. Some itchy and watery eyes in both sides. Symptoms are more severe during the day than at night.     Was diagnosed with symptomatic cholelithiasis in the ED on . Feeling improved. No fevers/chills. Abdominal pain is minimal. Has an appointment with a general surgeon tomorrow.           Objective    /69   Pulse 65   Temp 98.1  F (36.7  C)   Resp 16   Ht 1.497 m (4' 10.94\")   Wt 46.3 kg (102 lb)   LMP 2024 (Approximate)   SpO2 99%   BMI 20.65 kg/m    Body mass index is 20.65 kg/m .  Physical Exam   GENERAL: alert and no distress  EYES: Eyes with mildly injected conjunctiva, PERRL, no drainage, sclera are normal  HENT: Posterior pharyngeal cobblestoning noted. Normal tonsils.  NECK: no adenopathy, no asymmetry, masses, or scars  RESP: lungs clear to auscultation - no rales, rhonchi or wheezes  CV: regular rate and rhythm, normal S1 S2, no S3 or S4, no murmur, click or rub  ABDOMEN: soft, nontender, no hepatosplenomegaly, incisional hernia noted at the center of her  section scar with bearing down  SKIN: c/section incision is well healed with some mild tingling to light touch over the central aspect    Signed Electronically " by: Janis Verma MD

## 2024-04-15 NOTE — PATIENT INSTRUCTIONS
- I put in a referral for you to see a general surgeon to discuss the suspected hernia at your  section incision. Make sure to bring up your concerns about future pregnancy and  section     - Start using the nose spray once daily in each nostril for a few weeks to see if this helps. If it does, continue it.    - follow up in 4-6 weeks to check back on this and to discuss your urinary symptoms

## 2024-04-16 ENCOUNTER — OFFICE VISIT (OUTPATIENT)
Dept: SURGERY | Facility: CLINIC | Age: 29
End: 2024-04-16
Attending: EMERGENCY MEDICINE
Payer: COMMERCIAL

## 2024-04-16 VITALS — BODY MASS INDEX: 20.65 KG/M2 | WEIGHT: 102 LBS

## 2024-04-16 DIAGNOSIS — K43.2 INCISIONAL HERNIA, WITHOUT OBSTRUCTION OR GANGRENE: ICD-10-CM

## 2024-04-16 DIAGNOSIS — K80.20 SYMPTOMATIC CHOLELITHIASIS: ICD-10-CM

## 2024-04-16 PROCEDURE — T1013 SIGN LANG/ORAL INTERPRETER: HCPCS | Mod: U4 | Performed by: INTERPRETER

## 2024-04-16 PROCEDURE — 99244 OFF/OP CNSLTJ NEW/EST MOD 40: CPT | Performed by: SURGERY

## 2024-04-16 NOTE — PROGRESS NOTES
HPI: Belkis Sanchez is a 28 year old female referred to see me by Taj Padron for 2 separate issues.  The first is a focal area of abdominal pain along  scar that is been present now for the past year.  She notes a firm nodule at the midline location of the scar, with some burning sensation along the superficial incision and scar itself.  Her secondary issue for her visit today was a recent emergency department visit for epigastric and right upper quadrant pain.  She did report some radiation of pain to her back, and notes that this has occurred several times over the past 4 months.  During that ER visit she underwent ultrasound imaging which demonstrated cholelithiasis, but no overt evidence for cholecystitis.    She is most bothered by the lower abdominal pain and lump at her visit today.    Allergies:Patient has no known allergies.    Prior Medical History: No past medical history on file.    Prior Surgical History:   Past Surgical History:   Procedure Laterality Date    C/SECTION, CLASSICAL         CURRENT MEDS:  Prior to Admission medications    Medication Sig Start Date End Date Taking? Authorizing Provider   fluticasone (FLONASE) 50 MCG/ACT nasal spray Spray 1 spray into both nostrils daily 4/15/24  Yes Janis Verma MD   ketorolac (TORADOL) 10 MG tablet Take 1 tablet (10 mg) by mouth every 6 hours as needed for moderate pain  Patient not taking: Reported on 2024   Enmanuel Thomas MD         Family History   Problem Relation Age of Onset    No Known Problems Mother     No Known Problems Father     No Known Problems Maternal Grandmother     No Known Problems Maternal Grandfather     No Known Problems Paternal Grandmother     No Known Problems Paternal Grandfather     No Known Problems Brother     No Known Problems Sister     No Known Problems Son     No Known Problems Daughter     No Known Problems Maternal Half-Brother     No Known Problems Maternal Half-Sister     No Known  Problems Paternal Half-Brother     No Known Problems Paternal Half-Sister     No Known Problems Niece     No Known Problems Nephew     No Known Problems Cousin     No Known Problems Other     Cancer No family hx of     Diabetes No family hx of     Coronary Artery Disease No family hx of     Heart Disease No family hx of     Hypertension No family hx of     Hyperlipidemia No family hx of     Kidney Disease No family hx of     Cerebrovascular Disease No family hx of     Obesity No family hx of     Thrombosis No family hx of     Asthma No family hx of     Arthritis No family hx of     Thyroid Disease No family hx of     Depression No family hx of     Mental Illness No family hx of     Substance Abuse No family hx of     Cystic Fibrosis No family hx of     Early Death No family hx of     Coronary Artery Disease Early Onset No family hx of     Heart Failure No family hx of     Bleeding Diathesis No family hx of     Dementia No family hx of     Breast Cancer No family hx of     Ovarian Cancer No family hx of     Uterine Cancer No family hx of     Prostate Cancer No family hx of     Colorectal Cancer No family hx of     Pancreatic Cancer No family hx of     Lung Cancer No family hx of     Melanoma No family hx of     Autoimmune Disease No family hx of     Unknown/Adopted No family hx of     Genetic Disorder No family hx of         reports that she has never smoked. She has never used smokeless tobacco. She reports that she does not drink alcohol and does not use drugs.    History   Smoking Status    Never   Smokeless Tobacco    Never       Review of Systems -    The 10 point review of systems is within normal limits except as noted in HPI.    Wt 46.3 kg (102 lb)   LMP 03/27/2024 (Approximate)   BMI 20.65 kg/m    102 lbs 0 oz  Body mass index is 20.65 kg/m .    EXAM:  GENERAL: Alert, cooperative, appears stated age  ABDOMEN: Soft, nondistended.  Currently nontender to palpation across the upper abdomen.  Across the low  abdomen I do not appreciate an underlying hernia at the  scar, however there is a firm nodule involving the abdominal wall musculature in the midline.      LABS:  Lab Results   Component Value Date    HGB 13.7 2024    WBC 14.2 (H) 2024     2024    AST 17 2024    ALT 9 2024    ALKPHOS 63 2024    BILITOTAL 0.8 2024    LIPASE 42 2024           Assessment/Plan:   1. Symptomatic cholelithiasis    2. Incisional hernia, without obstruction or gangrene        I do not think she has an incisional hernia.  Given the firm nature of tissue at the scar, I think reflects either endometrial implant at the scar, versus nodular scar tissue.  CT imaging will be best for characterizing this further and a referral will be made for that imaging procedure.    With regard to her epigastric pain, she likely has been having some biliary colic.  We reviewed the normal function of the gallbladder, and development of cholelithiasis with progression biliary colic.  We discussed the risks and benefits of gallbladder removal including dissipated perioperative course and potential long-term impact on her diet and health with gallbladder removal.  She is unsure if she wishes to proceed with cholecystectomy which I think is a reasonable visitation.  I told her to take as much time as she needs to think about it.  We will be in contact with her once we see her imaging studies regarding her lower abdominal pain and palpable nodule.      35 minutes spent on the date of the encounter doing chart review, patient visit, and documentation    David Lilly MD ,MD  [facility] Department of Surgery'

## 2024-04-16 NOTE — LETTER
2024         RE: Belkis Sanchez  [address]  [place] [address]        Dear Colleague,    Thank you for referring your patient, Belkis Sanchez, to the [facility] [place]. Please see a copy of my visit note below.    HPI: Belkis Sanchez is a 28 year old female referred to see me by Taj Padron for 2 separate issues.  The first is a focal area of abdominal pain along  scar that is been present now for the past year.  She notes a firm nodule at the midline location of the scar, with some burning sensation along the superficial incision and scar itself.  Her secondary issue for her visit today was a recent emergency department visit for epigastric and right upper quadrant pain.  She did report some radiation of pain to her back, and notes that this has occurred several times over the past 4 months.  During that ER visit she underwent ultrasound imaging which demonstrated cholelithiasis, but no overt evidence for cholecystitis.    She is most bothered by the lower abdominal pain and lump at her visit today.    Allergies:Patient has no known allergies.    Prior Medical History: No past medical history on file.    Prior Surgical History:   Past Surgical History:   Procedure Laterality Date     C/SECTION, CLASSICAL         CURRENT MEDS:  Prior to Admission medications    Medication Sig Start Date End Date Taking? Authorizing Provider   fluticasone (FLONASE) 50 MCG/ACT nasal spray Spray 1 spray into both nostrils daily 4/15/24  Yes Janis Verma MD   ketorolac (TORADOL) 10 MG tablet Take 1 tablet (10 mg) by mouth every 6 hours as needed for moderate pain  Patient not taking: Reported on 2024   Enmanuel Thomas MD         Family History   Problem Relation Age of Onset     No Known Problems Mother      No Known Problems Father      No Known Problems Maternal Grandmother      No Known Problems Maternal Grandfather      No Known Problems  Paternal Grandmother      No Known Problems Paternal Grandfather      No Known Problems Brother      No Known Problems Sister      No Known Problems Son      No Known Problems Daughter      No Known Problems Maternal Half-Brother      No Known Problems Maternal Half-Sister      No Known Problems Paternal Half-Brother      No Known Problems Paternal Half-Sister      No Known Problems Niece      No Known Problems Nephew      No Known Problems Cousin      No Known Problems Other      Cancer No family hx of      Diabetes No family hx of      Coronary Artery Disease No family hx of      Heart Disease No family hx of      Hypertension No family hx of      Hyperlipidemia No family hx of      Kidney Disease No family hx of      Cerebrovascular Disease No family hx of      Obesity No family hx of      Thrombosis No family hx of      Asthma No family hx of      Arthritis No family hx of      Thyroid Disease No family hx of      Depression No family hx of      Mental Illness No family hx of      Substance Abuse No family hx of      Cystic Fibrosis No family hx of      Early Death No family hx of      Coronary Artery Disease Early Onset No family hx of      Heart Failure No family hx of      Bleeding Diathesis No family hx of      Dementia No family hx of      Breast Cancer No family hx of      Ovarian Cancer No family hx of      Uterine Cancer No family hx of      Prostate Cancer No family hx of      Colorectal Cancer No family hx of      Pancreatic Cancer No family hx of      Lung Cancer No family hx of      Melanoma No family hx of      Autoimmune Disease No family hx of      Unknown/Adopted No family hx of      Genetic Disorder No family hx of         reports that she has never smoked. She has never used smokeless tobacco. She reports that she does not drink alcohol and does not use drugs.    History   Smoking Status     Never   Smokeless Tobacco     Never       Review of Systems -    The 10 point review of systems is  within normal limits except as noted in HPI.    Wt 46.3 kg (102 lb)   LMP 2024 (Approximate)   BMI 20.65 kg/m    102 lbs 0 oz  Body mass index is 20.65 kg/m .    EXAM:  GENERAL: Alert, cooperative, appears stated age  ABDOMEN: Soft, nondistended.  Currently nontender to palpation across the upper abdomen.  Across the low abdomen I do not appreciate an underlying hernia at the  scar, however there is a firm nodule involving the abdominal wall musculature in the midline.      LABS:  Lab Results   Component Value Date    HGB 13.7 2024    WBC 14.2 (H) 2024     2024    AST 17 2024    ALT 9 2024    ALKPHOS 63 2024    BILITOTAL 0.8 2024    LIPASE 42 2024           Assessment/Plan:   1. Symptomatic cholelithiasis    2. Incisional hernia, without obstruction or gangrene        I do not think she has an incisional hernia.  Given the firm nature of tissue at the scar, I think reflects either endometrial implant at the scar, versus nodular scar tissue.  CT imaging will be best for characterizing this further and a referral will be made for that imaging procedure.    With regard to her epigastric pain, she likely has been having some biliary colic.  We reviewed the normal function of the gallbladder, and development of cholelithiasis with progression biliary colic.  We discussed the risks and benefits of gallbladder removal including dissipated perioperative course and potential long-term impact on her diet and health with gallbladder removal.  She is unsure if she wishes to proceed with cholecystectomy which I think is a reasonable visitation.  I told her to take as much time as she needs to think about it.  We will be in contact with her once we see her imaging studies regarding her lower abdominal pain and palpable nodule.      35 minutes spent on the date of the encounter doing chart review, patient visit, and documentation    David Lilly MD  ,MD  [facility] Department of Surgery'      Again, thank you for allowing me to participate in the care of your patient.        Sincerely,        David Lilly MD

## 2024-04-17 ENCOUNTER — OFFICE VISIT (OUTPATIENT)
Dept: FAMILY MEDICINE | Facility: CLINIC | Age: 29
End: 2024-04-17
Payer: COMMERCIAL

## 2024-04-17 VITALS
SYSTOLIC BLOOD PRESSURE: 101 MMHG | WEIGHT: 102 LBS | HEART RATE: 78 BPM | BODY MASS INDEX: 20.65 KG/M2 | TEMPERATURE: 98 F | DIASTOLIC BLOOD PRESSURE: 66 MMHG | RESPIRATION RATE: 16 BRPM | OXYGEN SATURATION: 99 %

## 2024-04-17 DIAGNOSIS — N63.0 MASS OF BREAST, UNSPECIFIED LATERALITY: Primary | ICD-10-CM

## 2024-04-17 PROCEDURE — 99213 OFFICE O/P EST LOW 20 MIN: CPT | Performed by: FAMILY MEDICINE

## 2024-04-17 NOTE — PROGRESS NOTES
Assessment:       Mass of breast, unspecified laterality  - MA Diagnostic Digital Bilateral       Plan:     Patient with bilateral nontender breast masses.  Will order bilateral diagnostic mammograms for further evaluation.  Further evaluation depending on results of the mammograms.  Patient agreeable with this plan.        Subjective:       28 year old female presents for evaluation of a couple of firm, nontender breast lumps that she has noticed in each breast over the past week.  The 1 on the left is the largest.  She is not nursing.  There is no overlying skin changes or nipple discharge.    Patient Active Problem List   Diagnosis    Hallucinations    Postpartum psychosis (H)       No past medical history on file.    Past Surgical History:   Procedure Laterality Date    C/SECTION, CLASSICAL         Current Outpatient Medications   Medication Sig Dispense Refill    fluticasone (FLONASE) 50 MCG/ACT nasal spray Spray 1 spray into both nostrils daily 9.9 mL 2    ketorolac (TORADOL) 10 MG tablet Take 1 tablet (10 mg) by mouth every 6 hours as needed for moderate pain 20 tablet 0     No current facility-administered medications for this visit.       No Known Allergies    Family History   Problem Relation Age of Onset    No Known Problems Mother     No Known Problems Father     No Known Problems Maternal Grandmother     No Known Problems Maternal Grandfather     No Known Problems Paternal Grandmother     No Known Problems Paternal Grandfather     No Known Problems Brother     No Known Problems Sister     No Known Problems Son     No Known Problems Daughter     No Known Problems Maternal Half-Brother     No Known Problems Maternal Half-Sister     No Known Problems Paternal Half-Brother     No Known Problems Paternal Half-Sister     No Known Problems Niece     No Known Problems Nephew     No Known Problems Cousin     No Known Problems Other     Cancer No family hx of     Diabetes No family hx of     Coronary Artery  Disease No family hx of     Heart Disease No family hx of     Hypertension No family hx of     Hyperlipidemia No family hx of     Kidney Disease No family hx of     Cerebrovascular Disease No family hx of     Obesity No family hx of     Thrombosis No family hx of     Asthma No family hx of     Arthritis No family hx of     Thyroid Disease No family hx of     Depression No family hx of     Mental Illness No family hx of     Substance Abuse No family hx of     Cystic Fibrosis No family hx of     Early Death No family hx of     Coronary Artery Disease Early Onset No family hx of     Heart Failure No family hx of     Bleeding Diathesis No family hx of     Dementia No family hx of     Breast Cancer No family hx of     Ovarian Cancer No family hx of     Uterine Cancer No family hx of     Prostate Cancer No family hx of     Colorectal Cancer No family hx of     Pancreatic Cancer No family hx of     Lung Cancer No family hx of     Melanoma No family hx of     Autoimmune Disease No family hx of     Unknown/Adopted No family hx of     Genetic Disorder No family hx of        Social History     Socioeconomic History    Marital status:      Spouse name: None    Number of children: None    Years of education: None    Highest education level: None   Tobacco Use    Smoking status: Never    Smokeless tobacco: Never   Vaping Use    Vaping status: Never Used   Substance and Sexual Activity    Alcohol use: Never    Drug use: Never    Sexual activity: Yes     Partners: Male     Social Determinants of Health     Interpersonal Safety: Low Risk  (4/15/2024)    Interpersonal Safety     Do you feel physically and emotionally safe where you currently live?: Yes     Within the past 12 months, have you been hit, slapped, kicked or otherwise physically hurt by someone?: No     Within the past 12 months, have you been humiliated or emotionally abused in other ways by your partner or ex-partner?: No     Review of Systems  Pertinent items  are noted in HPI.      Objective:     /66   Pulse 78   Temp 98  F (36.7  C)   Resp 16   Wt 46.3 kg (102 lb)   LMP 03/27/2024 (Approximate)   SpO2 99%   BMI 20.65 kg/m       General appearance: alert, appears stated age, and cooperative  Breasts: With tender breast lump measuring approximately 3 cm x 4 cm in diameter in the upper inner quadrant in the left breast and 2 cm x 3 cm breast lump in the upper inner quadrant of the right breast.  No nipple discharge.        This note has been dictated using voice recognition software. Any grammatical or context distortions are unintentional and inherent to the software

## 2024-04-18 NOTE — PROGRESS NOTES
Clinic Care Coordination Contact    Follow Up Progress Note      Assessment: I met with the pt today, pt had come in with a friend to ask for a letter of support. The pt has an interview for her green card, and is not remembering things. She wants a letter of support to state that she is not remember things. I told the pt that I will send a message to her doctor and see. I told her provider writes the letter we will call her to come get it.     Care Gaps:    Health Maintenance Due   Topic Date Due     PREVENTIVE CARE VISIT  Never done     ADVANCE CARE PLANNING  Never done     HPV IMMUNIZATION (1 - 2-dose series) Never done     HEPATITIS C SCREENING  Never done     PAP  Never done     COVID-19 Vaccine (2 - Moderna 3-dose series) 08/26/2021     INFLUENZA VACCINE (1) 09/01/2021     PHQ-2  01/01/2022       Currently there are no Care Gaps.    Goals addressed this encounter:   Goals Addressed    None         Intervention/Education provided during outreach: NA          Plan:     Care Coordinator will follow up in week   Detail Level: Zone Plan: Declined prescription for topical steroid today. She manages with otc shampoos. Advised to call if rx is desired Render In Strict Bullet Format?: No

## 2024-04-22 ENCOUNTER — APPOINTMENT (OUTPATIENT)
Dept: INTERPRETER SERVICES | Facility: CLINIC | Age: 29
End: 2024-04-22
Payer: COMMERCIAL

## 2024-04-29 ENCOUNTER — HOSPITAL ENCOUNTER (OUTPATIENT)
Dept: CT IMAGING | Facility: HOSPITAL | Age: 29
Discharge: HOME | End: 2024-04-29
Attending: SURGERY
Payer: COMMERCIAL

## 2024-04-29 ENCOUNTER — ANCILLARY PROCEDURE (OUTPATIENT)
Dept: MAMMOGRAPHY | Facility: CLINIC | Age: 29
End: 2024-04-29
Attending: FAMILY MEDICINE
Payer: COMMERCIAL

## 2024-04-29 DIAGNOSIS — N63.0 MASS OF BREAST, UNSPECIFIED LATERALITY: ICD-10-CM

## 2024-04-29 DIAGNOSIS — K43.2 INCISIONAL HERNIA, WITHOUT OBSTRUCTION OR GANGRENE: ICD-10-CM

## 2024-04-29 PROCEDURE — 74177 CT ABD & PELVIS W/CONTRAST: CPT

## 2024-04-29 PROCEDURE — 250N000009 HC RX 250: Performed by: SURGERY

## 2024-04-29 PROCEDURE — T1013 SIGN LANG/ORAL INTERPRETER: HCPCS | Mod: U4 | Performed by: INTERPRETER

## 2024-04-29 PROCEDURE — 76642 ULTRASOUND BREAST LIMITED: CPT | Mod: 50

## 2024-04-29 PROCEDURE — 250N000011 HC RX IP 250 OP 636: Performed by: SURGERY

## 2024-04-29 RX ORDER — IOPAMIDOL 755 MG/ML
50 INJECTION, SOLUTION INTRAVASCULAR ONCE
Status: COMPLETED | OUTPATIENT
Start: 2024-04-29 | End: 2024-04-29

## 2024-04-29 RX ADMIN — IOPAMIDOL 50 ML: 755 INJECTION, SOLUTION INTRAVENOUS at 15:46

## 2024-04-29 RX ADMIN — SODIUM CHLORIDE 90 ML: 9 INJECTION, SOLUTION INTRAVENOUS at 15:48

## 2024-05-02 ENCOUNTER — APPOINTMENT (OUTPATIENT)
Dept: INTERPRETER SERVICES | Facility: CLINIC | Age: 29
End: 2024-05-02
Payer: COMMERCIAL

## 2024-05-02 ENCOUNTER — TELEPHONE (OUTPATIENT)
Dept: FAMILY MEDICINE | Facility: CLINIC | Age: 29
End: 2024-05-02
Payer: COMMERCIAL

## 2024-05-02 NOTE — TELEPHONE ENCOUNTER
Patient called in looking for results on the CT -- she was told her Dr here would be sent the results and she would be contacted by Dr Padron.  She does have appt on 5/6 with Dr Mattson-- she will ask at that appt also

## 2024-05-03 ENCOUNTER — APPOINTMENT (OUTPATIENT)
Dept: INTERPRETER SERVICES | Facility: CLINIC | Age: 29
End: 2024-05-03
Payer: COMMERCIAL

## 2024-05-03 ENCOUNTER — TELEPHONE (OUTPATIENT)
Dept: SURGERY | Facility: CLINIC | Age: 29
End: 2024-05-03
Payer: COMMERCIAL

## 2024-05-03 NOTE — TELEPHONE ENCOUNTER
Patient calling to inquire about CT results she completed on 4/29/2024. She states today she has developed pain again in her abdomen area and would like further advisement on what to do about the pain moving forward.    Tita GEORGES RN, BSN

## 2024-05-06 ENCOUNTER — OFFICE VISIT (OUTPATIENT)
Dept: FAMILY MEDICINE | Facility: CLINIC | Age: 29
End: 2024-05-06
Payer: COMMERCIAL

## 2024-05-06 VITALS
BODY MASS INDEX: 20.16 KG/M2 | DIASTOLIC BLOOD PRESSURE: 66 MMHG | SYSTOLIC BLOOD PRESSURE: 102 MMHG | TEMPERATURE: 98 F | HEIGHT: 59 IN | WEIGHT: 100 LBS | RESPIRATION RATE: 20 BRPM | OXYGEN SATURATION: 98 % | HEART RATE: 67 BPM

## 2024-05-06 DIAGNOSIS — N89.8 VAGINAL DISCHARGE: ICD-10-CM

## 2024-05-06 DIAGNOSIS — Z00.00 ROUTINE GENERAL MEDICAL EXAMINATION AT A HEALTH CARE FACILITY: Primary | ICD-10-CM

## 2024-05-06 DIAGNOSIS — Z11.59 NEED FOR HEPATITIS C SCREENING TEST: ICD-10-CM

## 2024-05-06 DIAGNOSIS — Z12.4 CERVICAL CANCER SCREENING: ICD-10-CM

## 2024-05-06 LAB
CLUE CELLS: ABNORMAL
TRICHOMONAS, WET PREP: ABNORMAL
WBC'S/HIGH POWER FIELD, WET PREP: ABNORMAL
YEAST, WET PREP: ABNORMAL

## 2024-05-06 PROCEDURE — 99395 PREV VISIT EST AGE 18-39: CPT | Mod: GC

## 2024-05-06 PROCEDURE — 87210 SMEAR WET MOUNT SALINE/INK: CPT

## 2024-05-06 PROCEDURE — G0145 SCR C/V CYTO,THINLAYER,RESCR: HCPCS

## 2024-05-06 SDOH — HEALTH STABILITY: PHYSICAL HEALTH: ON AVERAGE, HOW MANY DAYS PER WEEK DO YOU ENGAGE IN MODERATE TO STRENUOUS EXERCISE (LIKE A BRISK WALK)?: 2 DAYS

## 2024-05-06 ASSESSMENT — SOCIAL DETERMINANTS OF HEALTH (SDOH): HOW OFTEN DO YOU GET TOGETHER WITH FRIENDS OR RELATIVES?: ONCE A WEEK

## 2024-05-06 NOTE — PROGRESS NOTES
Preventive Care Visit  [facility]  Conrad Mattson MD, Family Medicine  May 6, 2024  {Provider  Link to [facility] :216727}    {PROVIDER CHARTING PREFERENCE:622845}    Alexis Tamayo is a 28 year old, presenting for the following:  Physical, Pap smear (A lot of white discharge. On going for the last year. No smell), and C section irratation  (Meat, beef irritating patient when consuming )        5/6/2024    10:25 AM   Additional Questions   Roomed by Arabella godwin         5/6/2024   Forms   Any forms needing to be completed Yes         5/6/2024    Information    services provided? Yes   Language Hmong   Type of interpretation provided Face-to-face    name joshua gallardog    Agency [employer]    phone number [phone]        Health Care Directive  Patient does not have a Health Care Directive or Living Will: {ADVANCE_DIRECTIVE_STATUS:041023}    HPI  ***  {MA/LPN/RN Pre-Provider Visit Orders- hCG/UA/Strep (Optional):864154}  {SUPERLIST (Optional):720804}  {additonal problems for provider to add (Optional):563356}      5/6/2024   General Health   How would you rate your overall physical health? Good   Feel stress (tense, anxious, or unable to sleep) Only a little   (!) STRESS CONCERN      5/6/2024   Nutrition   Three or more servings of calcium each day? Yes   Diet: Regular (no restrictions)   How many servings of fruit and vegetables per day? (!) 2-3   How many sweetened beverages each day? 0-1         5/6/2024   Exercise   Days per week of moderate/strenous exercise 2 days   (!) EXERCISE CONCERN      5/6/2024   Social Factors   Frequency of gathering with friends or relatives Once a week   Worry food won't last until get money to buy more No   Food not last or not have enough money for food? No   Do you have housing?  Yes   Are you worried about losing your housing? No   Lack of transportation? No   Unable to get utilities  "(heat,electricity)? No         5/6/2024   Dental   Dentist two times every year? Yes         5/6/2024   TB Screening   Were you born outside of the [place]? Yes         Today's PHQ-2 Score:       5/6/2024    10:39 AM   PHQ-2 ( 1999 [employer])   Q1: Little interest or pleasure in doing things 0   Q2: Feeling down, depressed or hopeless 0   PHQ-2 Score 0   Q1: Little interest or pleasure in doing things Not at all   Q2: Feeling down, depressed or hopeless Not at all   PHQ-2 Score 0           5/6/2024   Substance Use   Alcohol more than 3/day or more than 7/wk No   Do you use any other substances recreationally? No     Social History     Tobacco Use    Smoking status: Never    Smokeless tobacco: Never   Vaping Use    Vaping status: Never Used   Substance Use Topics    Alcohol use: Never    Drug use: Never     {Provider  If there are gaps in the social history shown above, please follow the link to update and then refresh the note Link to Social and Substance History :676278}     {Mammogram Decision Support (Optional):514655}        5/6/2024   STI Screening   New sexual partner(s) since last STI/HIV test? No     History of abnormal Pap smear: { :890028}             5/6/2024   Contraception/Family Planning   Questions about contraception or family planning No     {Provider  Use the storyboard to review patient history, after sections have been marked as reviewed, refresh note to capture documentation:792515}   Reviewed and updated as needed this visit by Provider                    {HISTORY OPTIONS (Optional):272032}    {ROS Picklists (Optional):817108}     Objective    Exam  /66   Pulse 67   Temp 98  F (36.7  C) (Tympanic)   Resp 20   Ht 1.5 m (4' 11.06\")   Wt 45.4 kg (100 lb)   LMP 04/15/2024 (Approximate)   SpO2 98%   BMI 20.16 kg/m     Estimated body mass index is 20.16 kg/m  as calculated from the following:    Height as of this encounter: 1.5 m (4' 11.06\").    Weight as of this encounter: 45.4 kg (100 " lb).    Physical Exam  {Exam Choices (Optional):813329}        Signed Electronically by: Conrad Mattson MD  {Email feedback regarding this note to [email or web address]   :937252}

## 2024-05-06 NOTE — PROGRESS NOTES
Preventive Care Visit  [facility]  Conrad Mattson MD, Family Medicine  May 6, 2024      Assessment & Plan     (Z00.00) Routine general medical examination at a health care facility  (primary encounter diagnosis)  Comment: overall feeling well. Ask to go over her recent CT results sent by gen surg.  CT results showing endometrial tissue in the abdominal wall at  scar site as reported by surgery. Patient plans to follow up.   Plan: Hepatitis C Screen Reflex to HCV RNA Quant and         Genotype\         Pap smear            (Z11.59) Need for hepatitis C screening test  Comment: Hepatitis C Screen Reflex to HCV RNA Quant and Genotype       (Z12.4) Cervical cancer screening  Comment: PAP smear today  Plan: Pap Screen reflex to HPV if ASCUS - recommend         age 25 - 29, HPV Hold (Lab Only)            (N89.8) Vaginal discharge  Comment: whitish discharge for the past few days, no irritation. Not interested in STI testing  Plan: Wet preparation         Counseling  Appropriate preventive services were discussed with this patient, including applicable screening as appropriate for fall prevention, nutrition, physical activity, Tobacco-use cessation, weight loss and cognition.  Checklist reviewing preventive services available has been given to the patient.  Reviewed patient's diet, addressing concerns and/or questions.   She is at risk for lack of exercise and has been provided with information to increase physical activity for the benefit of her well-being.   She is at risk for psychosocial distress and has been provided with information to reduce risk.           Return in about 53 weeks (around 2025) for Annual Wellness Visit.    Alexis Tamayo is a 28 year old, presenting for the following:  Physical, Pap smear (A lot of white discharge. On going for the last year. No smell), and C section irratation  (Meat, beef irritating patient when consuming )        2024    10:25 AM    Additional Questions   Roomed by Arabella godwin         2024   Forms   Any forms needing to be completed Yes         2024    Information    services provided? Yes   Language Hmong   Type of interpretation provided Face-to-face    name joshua solano    [employer]    phone number [phone]        Health Care Directive  Patient does not have a Health Care Directive or Living Will: Discussed advance care planning with patient; information given to patient to review.    HPI  Feeling overall well  Discussed results of recent CT - scar endometrioma   Will make appointment to discuss options with surgery  U/S abdomen showed chololithiasis and gallbladder distension   Thinking about surgery currently asymptomatic  Notes excessive whitish discharge, no irritation or malodor   Duration few days, not interested in STI testing  LMP: mid April  Not on birth control           2024   General Health   How would you rate your overall physical health? Good   Feel stress (tense, anxious, or unable to sleep) Only a little   (!) STRESS CONCERN      2024   Nutrition   Three or more servings of calcium each day? Yes   Diet: Regular (no restrictions)   How many servings of fruit and vegetables per day? (!) 2-3   How many sweetened beverages each day? 0-1         2024   Exercise   Days per week of moderate/strenous exercise 2 days   (!) EXERCISE CONCERN      2024   Social Factors   Frequency of gathering with friends or relatives Once a week   Worry food won't last until get money to buy more No   Food not last or not have enough money for food? No   Do you have housing?  Yes   Are you worried about losing your housing? No   Lack of transportation? No   Unable to get utilities (heat,electricity)? No         2024   Dental   Dentist two times every year? Yes         2024   TB Screening   Were you born outside of the [place]? Yes         Today's  "PHQ-2 Score:       2024    10:39 AM   PHQ-2 (  [employer])   Q1: Little interest or pleasure in doing things 0   Q2: Feeling down, depressed or hopeless 0   PHQ-2 Score 0   Q1: Little interest or pleasure in doing things Not at all   Q2: Feeling down, depressed or hopeless Not at all   PHQ-2 Score 0           2024   Substance Use   Alcohol more than 3/day or more than 7/wk No   Do you use any other substances recreationally? No     Social History     Tobacco Use    Smoking status: Never    Smokeless tobacco: Never   Vaping Use    Vaping status: Never Used   Substance Use Topics    Alcohol use: Never    Drug use: Never          Mammogram Screening - Patient under 40 years of age: Routine Mammogram Screening not recommended.         2024   STI Screening   New sexual partner(s) since last STI/HIV test? No     History of abnormal Pap smear: NO - age 21-29 PAP every 3 years recommended        2024    11:15 AM   PAP / HPV   PAP Negative for Intraepithelial Lesion or Malignancy (NILM)            2024   Contraception/Family Planning   Questions about contraception or family planning No        Reviewed and updated as needed this visit by Provider                    No past medical history on file.  Past Surgical History:   Procedure Laterality Date    C/SECTION, CLASSICAL       OB History    Para Term  AB Living   1 1 1 0 0 1   SAB IAB Ectopic Multiple Live Births   0 0 0 0 1      # Outcome Date GA Lbr Napoleon/2nd Weight Sex Type Anes PTL Lv   1 Term 22 40w1d  3.97 kg (8 lb 12 oz) F CS-LTranv Spinal N TEVIN      Name: GABRIELE ANDREWRBLAKE LAE      Apgar1: 8  Apgar5: 9         Review of Systems  Constitutional, neuro, ENT, endocrine, pulmonary, cardiac, gastrointestinal, genitourinary, musculoskeletal, integument and psychiatric systems are negative, except as otherwise noted.     Objective    Exam  /66   Pulse 67   Temp 98  F (36.7  C) (Tympanic)   Resp 20   Ht 1.5 m (4' 11.06\")   Wt 45.4 " "kg (100 lb)   LMP 04/15/2024 (Approximate)   SpO2 98%   BMI 20.16 kg/m     Estimated body mass index is 20.16 kg/m  as calculated from the following:    Height as of this encounter: 1.5 m (4' 11.06\").    Weight as of this encounter: 45.4 kg (100 lb).    Physical Exam  GENERAL: alert and no distress  NECK: no adenopathy, no asymmetry, masses, or scars  RESP: lungs clear to auscultation - no rales, rhonchi or wheezes  CV: regular rate and rhythm, normal S1 S2, no murmur, no peripheral edema  ABDOMEN: soft, nontender, no hepatosplenomegaly, no masses and bowel sounds normal   (female): normal female external genitalia, normal urethral meatus, normal vaginal mucosa  MS: no gross musculoskeletal defects noted, no edema  NEURO: Normal strength and tone, mentation intact and speech normal  PSYCH: mentation appears normal, affect normal/bright        Signed Electronically by: IZZY Aragon PGY2    "

## 2024-05-06 NOTE — PATIENT INSTRUCTIONS
Preventive Care Advice   This is general advice given by our system to help you stay healthy. However, your care team may have specific advice just for you. Please talk to your care team about your preventive care needs.  Nutrition  Eat 5 or more servings of fruits and vegetables each day.  Try wheat bread, brown rice and whole grain pasta (instead of white bread, rice, and pasta).  Get enough calcium and vitamin D. Check the label on foods and aim for 100% of the RDA (recommended daily allowance).  Lifestyle  Exercise at least 150 minutes each week   (30 minutes a day, 5 days a week).  Do muscle strengthening activities 2 days a week. These help control your weight and prevent disease.  No smoking.  Wear sunscreen to prevent skin cancer.  Have a dental exam and cleaning every 6 months.  Yearly exams  See your health care team every year to talk about:  Any changes in your health.  Any medicines your care team has prescribed.  Preventive care, family planning, and ways to prevent chronic diseases.  Shots (vaccines)   HPV shots (up to age 26), if you've never had them before.  Hepatitis B shots (up to age 59), if you've never had them before.  COVID-19 shot: Get this shot when it's due.  Flu shot: Get a flu shot every year.  Tetanus shot: Get a tetanus shot every 10 years.  Pneumococcal, hepatitis A, and RSV shots: Ask your care team if you need these based on your risk.  Shingles shot (for age 50 and up).  General health tests  Diabetes screening:  Starting at age 35, Get screened for diabetes at least every 3 years.  If you are younger than age 35, ask your care team if you should be screened for diabetes.  Cholesterol test: At age 39, start having a cholesterol test every 5 years, or more often if advised.  Bone density scan (DEXA): At age 50, ask your care team if you should have this scan for osteoporosis (brittle bones).  Hepatitis C: Get tested at least once in your life.  STIs (sexually transmitted  infections)  Before age 24: Ask your care team if you should be screened for STIs.  After age 24: Get screened for STIs if you're at risk. You are at risk for STIs (including HIV) if:  You are sexually active with more than one person.  You don't use condoms every time.  You or a partner was diagnosed with a sexually transmitted infection.  If you are at risk for HIV, ask about PrEP medicine to prevent HIV.  Get tested for HIV at least once in your life, whether you are at risk for HIV or not.  Cancer screening tests  Cervical cancer screening: If you have a cervix, begin getting regular cervical cancer screening tests at age 21. Most people who have regular screenings with normal results can stop after age 65. Talk about this with your provider.  Breast cancer scan (mammogram): If you've ever had breasts, begin having regular mammograms starting at age 40. This is a scan to check for breast cancer.  Colon cancer screening: It is important to start screening for colon cancer at age 45.  Have a colonoscopy test every 10 years (or more often if you're at risk) Or, ask your provider about stool tests like a FIT test every year or Cologuard test every 3 years.  To learn more about your testing options, visit: https://www.[employer]/090262.pdf.  For help making a decision, visit: https://bit.ly/uc64364.  Prostate cancer screening test: If you have a prostate and are age 55 to 69, ask your provider if you would benefit from a yearly prostate cancer screening test.  Lung cancer screening: If you are a current or former smoker age 50 to 80, ask your care team if ongoing lung cancer screenings are right for you.  For informational purposes only. Not to replace the advice of your health care provider. Copyright   2023 [employer]. All rights reserved. Clinically reviewed by the [facility] Transitions Program. [employer] 806750 - REV 01/24.    Learning About Stress  What is stress?     Stress is your  body's response to a hard situation. Your body can have a physical, emotional, or mental response. Stress is a fact of life for most people, and it affects everyone differently. What causes stress for you may not be stressful for someone else.  A lot of things can cause stress. You may feel stress when you go on a job interview, take a test, or run a race. This kind of short-term stress is normal and even useful. It can help you if you need to work hard or react quickly. For example, stress can help you finish an important job on time.  Long-term stress is caused by ongoing stressful situations or events. Examples of long-term stress include long-term health problems, ongoing problems at work, or conflicts in your family. Long-term stress can harm your health.  How does stress affect your health?  When you are stressed, your body responds as though you are in danger. It makes hormones that speed up your heart, make you breathe faster, and give you a burst of energy. This is called the fight-or-flight stress response. If the stress is over quickly, your body goes back to normal and no harm is done.  But if stress happens too often or lasts too long, it can have bad effects. Long-term stress can make you more likely to get sick, and it can make symptoms of some diseases worse. If you tense up when you are stressed, you may develop neck, shoulder, or low back pain. Stress is linked to high blood pressure and heart disease.  Stress also harms your emotional health. It can make you larios, tense, or depressed. Your relationships may suffer, and you may not do well at work or school.  What can you do to manage stress?  You can try these things to help manage stress:   Do something active. Exercise or activity can help reduce stress. Walking is a great way to get started. Even everyday activities such as housecleaning or yard work can help.  Try yoga or bryant chi. These techniques combine exercise and meditation. You may need  some training at first to learn them.  Do something you enjoy. For example, listen to music or go to a movie. Practice your hobby or do volunteer work.  Meditate. This can help you relax, because you are not worrying about what happened before or what may happen in the future.  Do guided imagery. Imagine yourself in any setting that helps you feel calm. You can use online videos, books, or a teacher to guide you.  Do breathing exercises. For example:  From a standing position, bend forward from the waist with your knees slightly bent. Let your arms dangle close to the floor.  Breathe in slowly and deeply as you return to a standing position. Roll up slowly and lift your head last.  Hold your breath for just a few seconds in the standing position.  Breathe out slowly and bend forward from the waist.  Let your feelings out. Talk, laugh, cry, and express anger when you need to. Talking with supportive friends or family, a counselor, or a jonas leader about your feelings is a healthy way to relieve stress. Avoid discussing your feelings with people who make you feel worse.  Write. It may help to write about things that are bothering you. This helps you find out how much stress you feel and what is causing it. When you know this, you can find better ways to cope.  What can you do to prevent stress?  You might try some of these things to help prevent stress:  Manage your time. This helps you find time to do the things you want and need to do.  Get enough sleep. Your body recovers from the stresses of the day while you are sleeping.  Get support. Your family, friends, and community can make a difference in how you experience stress.  Limit your news feed. Avoid or limit time on social media or news that may make you feel stressed.  Do something active. Exercise or activity can help reduce stress. Walking is a great way to get started.  Where can you learn more?  Go to https://www.healthwise.net/patiented  Enter N032 in the  "search box to learn more about \"Learning About Stress.\"  Current as of: October 24, 2023               Content Version: 14.0    2563-7377 [employer].   Care instructions adapted under license by your healthcare professional. If you have questions about a medical condition or this instruction, always ask your healthcare professional. [employer] disclaims any warranty or liability for your use of this information.      "

## 2024-05-06 NOTE — TELEPHONE ENCOUNTER
RN called patient to relay provider message regarding CT results. Patient had additional questions about surgery, future pregnancies, and if it would be okay to hold off having surgery. Patient requested a follow-up telephone visit with surgeon to further discuss results and surgery options.     Telephone visit scheduled for 5/9/2024.    Tita GEORGES RN, BSN

## 2024-05-06 NOTE — PROGRESS NOTES
Faculty Supervision of Residents   I have examined this patient and the medical care has been evaluated and discussed with the resident. See resident note outlining our discussion.    CPE, pap and wet prep    Maricarmen Jordan MD

## 2024-05-09 LAB
BKR LAB AP GYN ADEQUACY: NORMAL
BKR LAB AP GYN INTERPRETATION: NORMAL
BKR LAB AP HPV REFLEX: NORMAL
BKR LAB AP LMP: NORMAL
BKR LAB AP PREVIOUS ABNORMAL: NORMAL
PATH REPORT.COMMENTS IMP SPEC: NORMAL
PATH REPORT.COMMENTS IMP SPEC: NORMAL
PATH REPORT.RELEVANT HX SPEC: NORMAL

## 2024-05-17 ENCOUNTER — TELEPHONE (OUTPATIENT)
Dept: SURGERY | Facility: CLINIC | Age: 29
End: 2024-05-17

## 2024-05-17 ENCOUNTER — VIRTUAL VISIT (OUTPATIENT)
Dept: SURGERY | Facility: CLINIC | Age: 29
End: 2024-05-17
Payer: COMMERCIAL

## 2024-05-17 ENCOUNTER — TELEPHONE (OUTPATIENT)
Dept: FAMILY MEDICINE | Facility: CLINIC | Age: 29
End: 2024-05-17

## 2024-05-17 DIAGNOSIS — N80.129 ENDOMETRIOMA: ICD-10-CM

## 2024-05-17 DIAGNOSIS — K80.20 SYMPTOMATIC CHOLELITHIASIS: Primary | ICD-10-CM

## 2024-05-17 RX ORDER — ACETAMINOPHEN 325 MG/1
975 TABLET ORAL ONCE
Status: CANCELLED | OUTPATIENT
Start: 2024-05-17 | End: 2024-05-17

## 2024-05-17 NOTE — LETTER
Pre-op Physical: Schedule a pre-op physical with your primary care doctor.  The pre-op physical must be 10-30 days before surgery and since it is required by anesthesia, your surgery will be cancelled if it's not done.     Surgery Date: 6/24/2024     Location: [place] [address]    Approximate Arrival Time: 12:00 noon  (Unless instructed differently by the pre-op call nurse)     Post op Appointment: Nurse will call you 3-5 days after surgery to check in.    Pre-Surgical Tasks:     Review all medications with your primary care or prescribing physician; they will advise you which meds to stop and when, and when you can resume taking.  Certain medications like blood thinners and weight loss medications need to be stopped in advance of surgery to proceed safely.      Blood thinners including but not exclusive to drugs like Xarelto, Eliquis, Warfarin and Aspirin, should be stopped five days before surgery, if your prescribing provider agrees. Follow your provider's advice on stopping blood thinners because they know you best.  If you are unsure if your medication is a blood thinner, ask your prescribing provider.    Weight loss medications: There are multiple medications being used for weight management and diabetes today, and the list is growing.  Phentermine, Ozempic, Wegovy, Trulicity, and other similar medications need to be stopped one week before surgery to avoid being cancelled.  Victoza and Saxenda can be continued longer but must be stopped one full day before surgery.  Please ask your prescribing provider for advice.    Diabetic medications: in addition to the medications talked about above that are used for either weight loss or diabetes, some people are on insulin that may require adjustment.  Please discuss managing diabetic medications with your prescribing doctor as these medications may require modification prior to surgery.     Please shower  the evening before and morning of surgery with Hibiclens soap.  This can be found at your local pharmacy.     Fasting instructions will be provided by the pre-op nurse who will call you 1-3 days before surgery.  Typically, we advise normal food up to 8 hours before you arrive for surgery. Clear liquids only from then until 2 hours before you arrive surgery, then nothing at all by mouth.  The nurse will review your specific instructions with you at the call.      Smoking impacts your body's ability to heal properly so we advise patients to quit if possible before surgery.  Plastic Surgery patients are required to be nicotine free for at least 8 weeks before surgery.      You will need an adult to drive you home and stay with you 24 hours after surgery. Public transportation or Medical Van Services are not permitted.    Visitor restrictions are subject to change, please verify with the pre-op nurse when they call how many people are permitted to accompany you.    We always encourage you to notify your insurance any time you have medical tests or procedures scheduled including surgery. The number is usually right on the back of your insurance card. To obtain pricing for surgery, please call  [email or web address] Cost of Care at [phone] or email [email or web address].        Call our office if you have any questions! Thank you!     Fareed Chahal  Complex   [facility]  [phone]

## 2024-05-17 NOTE — PROGRESS NOTES
"  The patient has been notified of following:     \"This telephone visit will be conducted via a call between you and your physician/provider. We have found that certain health care needs can be provided without the need for a physical exam.  This service lets us provide the care you need with a short phone conversation.  If a prescription is necessary we can send it directly to your pharmacy.  If lab work is needed we can place an order for that and you can then stop by our lab to have the test done at a later time.    Telephone visits are billed at different rates depending on your insurance coverage. During this emergency period, for some insurers they may be billed the same as an in-person visit.  Please reach out to your insurance provider with any questions.    If during the course of the call the physician/provider feels a telephone visit is not appropriate, you will not be charged for this service.\"    Patient has given verbal consent to a Telephone visit? Yes    What phone number would you like to be contacted at? [phone]    Patient would like to receive their AVS by [employer]      Telephone Start Time:  945    Telephone End Time (time telephone stopped):  Follow-up phone call visit conducted through phone  to discuss radiology results from her CT scan.  This demonstrated a 1.6 cm mass in the abdominal wall at the site of her previous  scar, consistent with an endometrial implant.  She also notes that she has been having some ongoing pain in her abdomen when eating beef, which would be consistent with recurrent biliary colic.  We reviewed her imaging, as well as her ongoing symptoms.  We also discussed the implications of the endometrioma in her abdominal wall on future pregnancies and what her chances of future pregnancy are if we pursue surgery.  At the end of our conversation she would like to pursue surgery for concurrent laparoscopic cholecystectomy, and excision of this " abdominal wall mass.    Originating Patient Location (pt. Location): Home      Distant Location (provider location):  On-site    Distant Location (provider location):  [facility] [place]     Phone call duration: 23 minutes    David Lilly MD, FACS  Office: [phone]  [facility]   General and Bariatric Surgery

## 2024-05-17 NOTE — LETTER
"    2024         RE: Belkis Sanchez  [address]  [place] [address]        Dear Colleague,    Thank you for referring your patient, Belkis Sanchez, to the [facility] [place]. Please see a copy of my visit note below.      The patient has been notified of following:     \"This telephone visit will be conducted via a call between you and your physician/provider. We have found that certain health care needs can be provided without the need for a physical exam.  This service lets us provide the care you need with a short phone conversation.  If a prescription is necessary we can send it directly to your pharmacy.  If lab work is needed we can place an order for that and you can then stop by our lab to have the test done at a later time.    Telephone visits are billed at different rates depending on your insurance coverage. During this emergency period, for some insurers they may be billed the same as an in-person visit.  Please reach out to your insurance provider with any questions.    If during the course of the call the physician/provider feels a telephone visit is not appropriate, you will not be charged for this service.\"    Patient has given verbal consent to a Telephone visit? Yes    What phone number would you like to be contacted at? [phone]    Patient would like to receive their AVS by [employer]      Telephone Start Time:  945    Telephone End Time (time telephone stopped):  Follow-up phone call visit conducted through phone  to discuss radiology results from her CT scan.  This demonstrated a 1.6 cm mass in the abdominal wall at the site of her previous  scar, consistent with an endometrial implant.  She also notes that she has been having some ongoing pain in her abdomen when eating beef, which would be consistent with recurrent biliary colic.  We reviewed her imaging, as well as her ongoing symptoms.  We also discussed the implications " of the endometrioma in her abdominal wall on future pregnancies and what her chances of future pregnancy are if we pursue surgery.  At the end of our conversation she would like to pursue surgery for concurrent laparoscopic cholecystectomy, and excision of this abdominal wall mass.    Originating Patient Location (pt. Location): Home      Distant Location (provider location):  On-site    Distant Location (provider location):  [facility] SURGERY CLINIC AND BARIATRICS C[facility] [place]     Phone call duration: 23 minutes    David Lilly MD, FACS  Office: [phone]  [facility]   General and Bariatric Surgery               Again, thank you for allowing me to participate in the care of your patient.        Sincerely,        David Lilly MD

## 2024-05-17 NOTE — TELEPHONE ENCOUNTER
"Patient called in asking about having her scar from  \"fixed\" --kept insisting over and over that her \"friend\" said she could just talk to a nurse to gt it fixed -- she refused an appt to get a referral -- I sent her invite for MyChart so she could ask her question. I tried to explain to her several times for a referral she should call her surgeon -- that way she avoids another Dr visit for the referral.    "

## 2024-05-22 PROBLEM — N80.129 ENDOMETRIOMA: Status: ACTIVE | Noted: 2024-05-17

## 2024-05-22 PROBLEM — K80.20 SYMPTOMATIC CHOLELITHIASIS: Status: ACTIVE | Noted: 2024-05-17

## 2024-05-22 NOTE — TELEPHONE ENCOUNTER
Spoke with patient today regarding surgery scheduling      Went over details/instructions.    Surgery Letter sent via Mail  Is this the correct address?: Yes  [address]  [place] [address]  (Please see LETTERS TAB in chart to retrieve a copy of this letter)

## 2024-05-25 ENCOUNTER — HOSPITAL ENCOUNTER (EMERGENCY)
Facility: HOSPITAL | Age: 29
Discharge: HOME | End: 2024-05-26
Attending: STUDENT IN AN ORGANIZED HEALTH CARE EDUCATION/TRAINING PROGRAM | Admitting: STUDENT IN AN ORGANIZED HEALTH CARE EDUCATION/TRAINING PROGRAM
Payer: COMMERCIAL

## 2024-05-25 DIAGNOSIS — R10.30 LOWER ABDOMINAL PAIN: ICD-10-CM

## 2024-05-25 DIAGNOSIS — K64.4 EXTERNAL HEMORRHOIDS: ICD-10-CM

## 2024-05-25 LAB
ANION GAP SERPL CALCULATED.3IONS-SCNC: 9 MMOL/L (ref 7–15)
BASOPHILS # BLD AUTO: 0.1 10E3/UL (ref 0–0.2)
BASOPHILS NFR BLD AUTO: 1 %
BUN SERPL-MCNC: 11.1 MG/DL (ref 6–20)
CALCIUM SERPL-MCNC: 9 MG/DL (ref 8.6–10)
CHLORIDE SERPL-SCNC: 104 MMOL/L (ref 98–107)
CREAT SERPL-MCNC: 0.65 MG/DL (ref 0.51–0.95)
DEPRECATED HCO3 PLAS-SCNC: 26 MMOL/L (ref 22–29)
EGFRCR SERPLBLD CKD-EPI 2021: >90 ML/MIN/1.73M2
EOSINOPHIL # BLD AUTO: 0.3 10E3/UL (ref 0–0.7)
EOSINOPHIL NFR BLD AUTO: 3 %
ERYTHROCYTE [DISTWIDTH] IN BLOOD BY AUTOMATED COUNT: 12.1 % (ref 10–15)
GLUCOSE SERPL-MCNC: 122 MG/DL (ref 70–99)
HCT VFR BLD AUTO: 39.7 % (ref 35–47)
HGB BLD-MCNC: 12.8 G/DL (ref 11.7–15.7)
HOLD SPECIMEN: NORMAL
HOLD SPECIMEN: NORMAL
IMM GRANULOCYTES # BLD: 0 10E3/UL
IMM GRANULOCYTES NFR BLD: 0 %
LYMPHOCYTES # BLD AUTO: 2.9 10E3/UL (ref 0.8–5.3)
LYMPHOCYTES NFR BLD AUTO: 32 %
MCH RBC QN AUTO: 30 PG (ref 26.5–33)
MCHC RBC AUTO-ENTMCNC: 32.2 G/DL (ref 31.5–36.5)
MCV RBC AUTO: 93 FL (ref 78–100)
MONOCYTES # BLD AUTO: 0.7 10E3/UL (ref 0–1.3)
MONOCYTES NFR BLD AUTO: 8 %
NEUTROPHILS # BLD AUTO: 5.1 10E3/UL (ref 1.6–8.3)
NEUTROPHILS NFR BLD AUTO: 56 %
NRBC # BLD AUTO: 0 10E3/UL
NRBC BLD AUTO-RTO: 0 /100
PLATELET # BLD AUTO: 283 10E3/UL (ref 150–450)
POTASSIUM SERPL-SCNC: 4 MMOL/L (ref 3.4–5.3)
RBC # BLD AUTO: 4.26 10E6/UL (ref 3.8–5.2)
SODIUM SERPL-SCNC: 139 MMOL/L (ref 135–145)
WBC # BLD AUTO: 9 10E3/UL (ref 4–11)

## 2024-05-25 PROCEDURE — 96374 THER/PROPH/DIAG INJ IV PUSH: CPT | Mod: 59

## 2024-05-25 PROCEDURE — 85025 COMPLETE CBC W/AUTO DIFF WBC: CPT | Performed by: STUDENT IN AN ORGANIZED HEALTH CARE EDUCATION/TRAINING PROGRAM

## 2024-05-25 PROCEDURE — 99285 EMERGENCY DEPT VISIT HI MDM: CPT | Mod: 25

## 2024-05-25 PROCEDURE — 250N000011 HC RX IP 250 OP 636: Performed by: STUDENT IN AN ORGANIZED HEALTH CARE EDUCATION/TRAINING PROGRAM

## 2024-05-25 PROCEDURE — 36415 COLL VENOUS BLD VENIPUNCTURE: CPT | Performed by: STUDENT IN AN ORGANIZED HEALTH CARE EDUCATION/TRAINING PROGRAM

## 2024-05-25 PROCEDURE — 80048 BASIC METABOLIC PNL TOTAL CA: CPT | Performed by: STUDENT IN AN ORGANIZED HEALTH CARE EDUCATION/TRAINING PROGRAM

## 2024-05-25 PROCEDURE — 250N000013 HC RX MED GY IP 250 OP 250 PS 637: Performed by: STUDENT IN AN ORGANIZED HEALTH CARE EDUCATION/TRAINING PROGRAM

## 2024-05-25 RX ORDER — KETOROLAC TROMETHAMINE 15 MG/ML
15 INJECTION, SOLUTION INTRAMUSCULAR; INTRAVENOUS ONCE
Status: COMPLETED | OUTPATIENT
Start: 2024-05-25 | End: 2024-05-25

## 2024-05-25 RX ORDER — ACETAMINOPHEN 325 MG/1
975 TABLET ORAL ONCE
Status: COMPLETED | OUTPATIENT
Start: 2024-05-25 | End: 2024-05-25

## 2024-05-25 RX ADMIN — ACETAMINOPHEN 975 MG: 325 TABLET ORAL at 22:25

## 2024-05-25 RX ADMIN — KETOROLAC TROMETHAMINE 15 MG: 15 INJECTION, SOLUTION INTRAMUSCULAR; INTRAVENOUS at 22:26

## 2024-05-25 ASSESSMENT — ACTIVITIES OF DAILY LIVING (ADL)
ADLS_ACUITY_SCORE: 35
ADLS_ACUITY_SCORE: 33

## 2024-05-26 ENCOUNTER — APPOINTMENT (OUTPATIENT)
Dept: CT IMAGING | Facility: HOSPITAL | Age: 29
End: 2024-05-26
Attending: STUDENT IN AN ORGANIZED HEALTH CARE EDUCATION/TRAINING PROGRAM
Payer: COMMERCIAL

## 2024-05-26 VITALS
DIASTOLIC BLOOD PRESSURE: 58 MMHG | HEART RATE: 63 BPM | RESPIRATION RATE: 16 BRPM | OXYGEN SATURATION: 97 % | BODY MASS INDEX: 20.42 KG/M2 | WEIGHT: 101.3 LBS | TEMPERATURE: 98 F | SYSTOLIC BLOOD PRESSURE: 99 MMHG

## 2024-05-26 LAB — HCG UR QL: NEGATIVE

## 2024-05-26 PROCEDURE — 81025 URINE PREGNANCY TEST: CPT | Performed by: STUDENT IN AN ORGANIZED HEALTH CARE EDUCATION/TRAINING PROGRAM

## 2024-05-26 PROCEDURE — 74177 CT ABD & PELVIS W/CONTRAST: CPT

## 2024-05-26 PROCEDURE — 250N000011 HC RX IP 250 OP 636: Performed by: STUDENT IN AN ORGANIZED HEALTH CARE EDUCATION/TRAINING PROGRAM

## 2024-05-26 RX ORDER — IOPAMIDOL 755 MG/ML
50 INJECTION, SOLUTION INTRAVASCULAR ONCE
Status: COMPLETED | OUTPATIENT
Start: 2024-05-26 | End: 2024-05-26

## 2024-05-26 RX ADMIN — IOPAMIDOL 50 ML: 755 INJECTION, SOLUTION INTRAVENOUS at 01:29

## 2024-05-26 ASSESSMENT — ACTIVITIES OF DAILY LIVING (ADL)
ADLS_ACUITY_SCORE: 35
ADLS_ACUITY_SCORE: 35

## 2024-05-26 NOTE — ED PROVIDER NOTES
EMERGENCY DEPARTMENT ENCOUNTER      NAME: Belkis Sanchez  AGE: 28 year old female  YOB: 1995  MRN: [id number]  EVALUATION DATE & TIME: No admission date for patient encounter.    PCP: Taj Padron    ED PROVIDER: Enmanuel Whittington MD      Chief Complaint   Patient presents with    Rectal Issue         FINAL IMPRESSION:  1. Lower abdominal pain    2. External hemorrhoids          ED COURSE & MEDICAL DECISION MAKING:    Pertinent Labs & Imaging studies reviewed. (See chart for details)  28 year old female presents to the Emergency Department for evaluation of rectal issue and abd pain    ED Course as of 24 0154   Sat May 25, 2024   2200 Patient is a 28-year-old female with a past medical history significant for endometrioma in her  scar who presents to the emergency department with worsening pain at this site, provoked with movement, but absent at rest.  No evidence of peritonitis on exam.  No surrounding erythema, fluctuance, warmth at the  scar site.  As a separate issue she also has an external hemorrhoid that is not thrombosed, not bleeding, and mild on exam.  Discussed symptomatic cares for this and if not resolving in several weeks can discuss with her general surgeon.  She has plans in 1 month to have elective cholecystectomy as well as removal of the endometrioma with Dr. Kingston.  Given her worsening symptoms we will obtain CT scan to evaluate for changes in the endometrioma, and provide pain control with Tylenol and Toradol.   2258 No electrolyte abnormalities or kidney injury.  No leukocytosis or anemia.   Sun May 26, 2024   0152 No acute changes on CT abdomen and pelvis, specifically no changes in the nodule seen in the  scar.  Will discharge at this time with return precautions and instructions to follow-up with general surgery regarding her plans for surgery in 1 month, and to discuss her hemorrhoids.       9:46 PM I met the patient and performed my initial  interview and exam.     Medical Decision Making  Obtained supplemental history:Supplemental history obtained?: No  Reviewed external records: External records reviewed?: No  Care impacted by chronic illness:N/A  Care significantly affected by social determinants of health:N/A  Did you consider but not order tests?: Work up considered but not performed and documented in chart, if applicable  Did you interpret images independently?: Independent interpretation of ECG and images noted in documentation, when applicable.  Consultation discussion with other provider:Did you involve another provider (consultant, , pharmacy, etc.)?: No  Discharge. No recommendations on prescription strength medication(s). See documentation for any additional details.        At the conclusion of the encounter I discussed the results of all of the tests and the disposition. The questions were answered. The patient or family acknowledged understanding and was agreeable with the care plan.     0 minutes of critical care time     MEDICATIONS GIVEN IN THE EMERGENCY:  Medications   acetaminophen (TYLENOL) tablet 975 mg (975 mg Oral $Given 24)   ketorolac (TORADOL) injection 15 mg (15 mg Intravenous $Given 24)   iopamidol (ISOVUE-370) solution 50 mL (50 mLs Intravenous $Given 24 0129)       NEW PRESCRIPTIONS STARTED AT TODAY'S ER VISIT  New Prescriptions    No medications on file          =================================================================    HPI    Patient information was obtained from: patient     Use of : Yes (In Person) - Language Jez Sanchez is a 28 year old female with a pertinent history of endometrioma who presents to this ED via walk-in for evaluation of rectal issue.    A few days ago, the patient noted a lump on her anus area a few days ago. She also started having loose stool a few days ago as well.    A few years ago, the patient had a . The resulting scar has developed  a lump on the suprapubic area, with pain incited with sitting, standing, or moving. Recently, the patient noted more pain than usual. She denies pain with movement, and has not taken any pain medication.       PAST MEDICAL HISTORY:  History reviewed. No pertinent past medical history.    PAST SURGICAL HISTORY:  Past Surgical History:   Procedure Laterality Date    C/SECTION, CLASSICAL             CURRENT MEDICATIONS:    fluticasone (FLONASE) 50 MCG/ACT nasal spray  ketorolac (TORADOL) 10 MG tablet        ALLERGIES:  No Known Allergies    FAMILY HISTORY:  Family History   Problem Relation Age of Onset    No Known Problems Mother     No Known Problems Father     No Known Problems Maternal Grandmother     No Known Problems Maternal Grandfather     No Known Problems Paternal Grandmother     No Known Problems Paternal Grandfather     No Known Problems Brother     No Known Problems Sister     No Known Problems Son     No Known Problems Daughter     No Known Problems Maternal Half-Brother     No Known Problems Maternal Half-Sister     No Known Problems Paternal Half-Brother     No Known Problems Paternal Half-Sister     No Known Problems Niece     No Known Problems Nephew     No Known Problems Cousin     No Known Problems Other     Cancer No family hx of     Diabetes No family hx of     Coronary Artery Disease No family hx of     Heart Disease No family hx of     Hypertension No family hx of     Hyperlipidemia No family hx of     Kidney Disease No family hx of     Cerebrovascular Disease No family hx of     Obesity No family hx of     Thrombosis No family hx of     Asthma No family hx of     Arthritis No family hx of     Thyroid Disease No family hx of     Depression No family hx of     Mental Illness No family hx of     Substance Abuse No family hx of     Cystic Fibrosis No family hx of     Early Death No family hx of     Coronary Artery Disease Early Onset No family hx of     Heart Failure No family hx of     Bleeding  Diathesis No family hx of     Dementia No family hx of     Breast Cancer No family hx of     Ovarian Cancer No family hx of     Uterine Cancer No family hx of     Prostate Cancer No family hx of     Colorectal Cancer No family hx of     Pancreatic Cancer No family hx of     Lung Cancer No family hx of     Melanoma No family hx of     Autoimmune Disease No family hx of     Unknown/Adopted No family hx of     Genetic Disorder No family hx of        SOCIAL HISTORY:   Social History     Socioeconomic History    Marital status:    Tobacco Use    Smoking status: Never    Smokeless tobacco: Never   Vaping Use    Vaping status: Never Used   Substance and Sexual Activity    Alcohol use: Never    Drug use: Never    Sexual activity: Yes     Partners: Male     Social Determinants of Health     Financial Resource Strain: Low Risk  (5/6/2024)    Financial Resource Strain     Within the past 12 months, have you or your family members you live with been unable to get utilities (heat, electricity) when it was really needed?: No   Food Insecurity: Low Risk  (5/6/2024)    Food Insecurity     Within the past 12 months, did you worry that your food would run out before you got money to buy more?: No     Within the past 12 months, did the food you bought just not last and you didn t have money to get more?: No   Transportation Needs: Low Risk  (5/6/2024)    Transportation Needs     Within the past 12 months, has lack of transportation kept you from medical appointments, getting your medicines, non-medical meetings or appointments, work, or from getting things that you need?: No   Physical Activity: Unknown (5/6/2024)    Exercise Vital Sign     Days of Exercise per Week: 2 days   Stress: No Stress Concern Present (5/6/2024)    [employer] [employer] - Occupational Stress Questionnaire     Feeling of Stress : Only a little   Social Connections: Unknown (5/6/2024)    Social Connection and Isolation Panel [NHANES]      Frequency of Social Gatherings with Friends and Family: Once a week   Interpersonal Safety: Low Risk  (2024)    Interpersonal Safety     Do you feel physically and emotionally safe where you currently live?: Yes     Within the past 12 months, have you been hit, slapped, kicked or otherwise physically hurt by someone?: No     Within the past 12 months, have you been humiliated or emotionally abused in other ways by your partner or ex-partner?: No   Housing Stability: Low Risk  (2024)    Housing Stability     Do you have housing? : Yes     Are you worried about losing your housing?: No       VITALS:  /72   Pulse 62   Temp 98  F (36.7  C) (Oral)   Resp 16   Wt 45.9 kg (101 lb 4.8 oz)   LMP 04/15/2024 (Approximate)   SpO2 98%   BMI 20.42 kg/m      PHYSICAL EXAM    Physical Exam  Vitals and nursing note reviewed. Exam conducted with a chaperone present (ANGIE Collins).   Constitutional:       General: She is not in acute distress.     Appearance: Normal appearance. She is normal weight. She is not ill-appearing.   HENT:      Head: Normocephalic and atraumatic.      Nose: Nose normal.      Mouth/Throat:      Mouth: Mucous membranes are moist.      Pharynx: Oropharynx is clear.   Eyes:      Extraocular Movements: Extraocular movements intact.      Conjunctiva/sclera: Conjunctivae normal.   Cardiovascular:      Rate and Rhythm: Normal rate.      Pulses: Normal pulses.   Pulmonary:      Effort: Pulmonary effort is normal.   Abdominal:      General: Abdomen is flat. There is no distension.      Palpations: Abdomen is soft.      Tenderness: There is no abdominal tenderness.      Comments: A firm nodule palpated at the center of  scar without erythema, warmth.  No dehiscence of the scar tissue.  No fluctuance.  No tenderness to palpation.   Genitourinary:     Comments: Small external hemorrhoid, not thrombosed or bleeding  Musculoskeletal:         General: Normal range of motion.      Cervical  back: Normal range of motion.      Right lower leg: No edema.      Left lower leg: No edema.   Skin:     General: Skin is warm and dry.      Capillary Refill: Capillary refill takes less than 2 seconds.   Neurological:      General: No focal deficit present.      Mental Status: She is alert and oriented to person, place, and time. Mental status is at baseline.   Psychiatric:         Mood and Affect: Mood normal.         Behavior: Behavior normal.         Thought Content: Thought content normal.         Judgment: Judgment normal.            LAB:  All pertinent labs reviewed and interpreted.  Results for orders placed or performed during the hospital encounter of 24   CT Abdomen Pelvis w Contrast    Impression    IMPRESSION:   1.  Scar of prior  section. Small enhancing nodule in the superficial fat adjacent to the scar representing a small endometrioma, unchanged.    2.  No mechanical bowel obstruction or free gas. Normal appendix.    3.  Exam stable when compared to prior study.   Basic metabolic panel   Result Value Ref Range    Sodium 139 135 - 145 mmol/L    Potassium 4.0 3.4 - 5.3 mmol/L    Chloride 104 98 - 107 mmol/L    Carbon Dioxide (CO2) 26 22 - 29 mmol/L    Anion Gap 9 7 - 15 mmol/L    Urea Nitrogen 11.1 6.0 - 20.0 mg/dL    Creatinine 0.65 0.51 - 0.95 mg/dL    GFR Estimate >90 >60 mL/min/1.73m2    Calcium 9.0 8.6 - 10.0 mg/dL    Glucose 122 (H) 70 - 99 mg/dL   CBC with platelets and differential   Result Value Ref Range    WBC Count 9.0 4.0 - 11.0 10e3/uL    RBC Count 4.26 3.80 - 5.20 10e6/uL    Hemoglobin 12.8 11.7 - 15.7 g/dL    Hematocrit 39.7 35.0 - 47.0 %    MCV 93 78 - 100 fL    MCH 30.0 26.5 - 33.0 pg    MCHC 32.2 31.5 - 36.5 g/dL    RDW 12.1 10.0 - 15.0 %    Platelet Count 283 150 - 450 10e3/uL    % Neutrophils 56 %    % Lymphocytes 32 %    % Monocytes 8 %    % Eosinophils 3 %    % Basophils 1 %    % Immature Granulocytes 0 %    NRBCs per 100 WBC 0 <1 /100    Absolute Neutrophils 5.1  1.6 - 8.3 10e3/uL    Absolute Lymphocytes 2.9 0.8 - 5.3 10e3/uL    Absolute Monocytes 0.7 0.0 - 1.3 10e3/uL    Absolute Eosinophils 0.3 0.0 - 0.7 10e3/uL    Absolute Basophils 0.1 0.0 - 0.2 10e3/uL    Absolute Immature Granulocytes 0.0 <=0.4 10e3/uL    Absolute NRBCs 0.0 10e3/uL   Extra Blue Top Tube   Result Value Ref Range    Hold Specimen JIC    Extra Red Top Tube   Result Value Ref Range    Hold Specimen JIC    HCG qualitative urine   Result Value Ref Range    hCG Urine Qualitative Negative Negative       RADIOLOGY:  Reviewed all pertinent imaging. Please see official radiology report.  CT Abdomen Pelvis w Contrast   Final Result   IMPRESSION:    1.  Scar of prior  section. Small enhancing nodule in the superficial fat adjacent to the scar representing a small endometrioma, unchanged.      2.  No mechanical bowel obstruction or free gas. Normal appendix.      3.  Exam stable when compared to prior study.          PROCEDURES:   None      [facility] Documentation:   CMS Diagnoses:               I, Festus Thayer, am serving as a scribe to document services personally performed by Enmanuel Whittington MD based on my observation and the provider's statements to me. I, Enmanuel Whittington MD, attest that Festus Thayer is acting in a scribe capacity, has observed my performance of the services and has documented them in accordance with my direction.    Enmanuel Whittington MD  [facility] EMERGENCY DEPARTMENT  [address] [place] [id number]  [phone]       Enmanuel Whittington MD  24 0154

## 2024-05-26 NOTE — ED TRIAGE NOTES
"Pt arrives ambulatory to triage due to \"mass on incision on stomach and extra skin on rectum.\" Pt states there is only pain on her incision when she presses on it. Pt does not have rectal pain but has had diarrhea multiple times a day. Pt denies any [facility]  used for triage.         "

## 2024-05-29 ENCOUNTER — OFFICE VISIT (OUTPATIENT)
Dept: FAMILY MEDICINE | Facility: CLINIC | Age: 29
End: 2024-05-29
Payer: COMMERCIAL

## 2024-05-29 VITALS
SYSTOLIC BLOOD PRESSURE: 104 MMHG | HEIGHT: 58 IN | OXYGEN SATURATION: 99 % | RESPIRATION RATE: 18 BRPM | BODY MASS INDEX: 20.36 KG/M2 | TEMPERATURE: 98.9 F | WEIGHT: 97 LBS | HEART RATE: 76 BPM | DIASTOLIC BLOOD PRESSURE: 70 MMHG

## 2024-05-29 DIAGNOSIS — K80.80 BILIARY CALCULUS OF OTHER SITE WITHOUT OBSTRUCTION: Primary | ICD-10-CM

## 2024-05-29 DIAGNOSIS — M79.89 SOFT TISSUE MASS: ICD-10-CM

## 2024-05-29 DIAGNOSIS — K64.4 EXTERNAL HEMORRHOIDS: ICD-10-CM

## 2024-05-29 PROCEDURE — 99213 OFFICE O/P EST LOW 20 MIN: CPT | Mod: GC | Performed by: MASSAGE THERAPIST

## 2024-05-29 NOTE — PROGRESS NOTES
Preceptor Attestation:  Patient's case reviewed and discussed with Taj Padron MD resident and I evaluated the patient. I agree with written assessment and plan of care.  Supervising Physician:  Alton Pena MD, MD [facility]

## 2024-05-29 NOTE — PROGRESS NOTES
"  Assessment & Plan     Biliary calculus of other site without obstruction  Discussed natural history of gallstones and likelihood of recurrence. Discussed no role for repeat imaging without recurrence of symptoms. Wants to hold off on cholecystectomy if the stones are shrinking with her Hmong remedies. Message sent to her general surgeon to see if he would be agreeable to order repeat scan.     Soft tissue mass  Does want excision of  scar mass. It rubs on clothes, is uncomfortable with sitting etc. Was hoping hemorrhoids could be excised at same time.     External hemorrhoids  Seen in ED for this , still using topical cream they gave her. Discussed if not improving in next 2-3 weeks would reevaluate. Did not have time in todays visit to dive deeply into this concern.  Needs a more thorough evaluation in the future if this does not resolve.        No follow-ups on file.          Alexis Tamayo is a 28 year old, presenting for the following health issues:  reffaral (Kidney stone Surgery) and in grown (Going through in surgery in  and would like to know if she can do skin tag removal from the anus )    HPI     Abdominal Pain   Seen by general surgery on 2024 for symptomatic cholelithiasis, and pain along her  scar, consistent with endometrial implant.  Seen again by surgery at a virtual visit , where CT results were discussed with her.  Plan for laparoscopic cholecystectomy and excision of mass along her  scar.    Today she reports,   Gall stone   Not sure she wants to do surgery for gallbladder (scheduled in )   Taking Hmong remedy and feels its getting better     scar lump  Is painful, rubs on clothing     Anal Lump   No bleeding   Does have some pus  Ingrown hair   Seen in ED  with external hemorrhoid               Objective    /70   Pulse 76   Temp 98.9  F (37.2  C) (Tympanic)   Resp 18   Ht 1.48 m (4' 10.27\")   Wt 44 kg (97 lb)   LMP " 05/16/2024 (Approximate)   SpO2 99%   BMI 20.09 kg/m    Body mass index is 20.09 kg/m .  Physical Exam   GENERAL: healthy, alert and no distress  RESP: speaking in full sentences, normal work of breathing   CV: extremities appear well perfused  ABDOMEN: nondistended   MS: no gross musculoskeletal defects noted  PSYCH: mentation appears normal, affect normal/bright              Signed Electronically by: Taj Padron MD

## 2024-06-06 ENCOUNTER — APPOINTMENT (OUTPATIENT)
Dept: INTERPRETER SERVICES | Facility: CLINIC | Age: 29
End: 2024-06-06
Payer: COMMERCIAL

## 2024-06-07 ENCOUNTER — TELEPHONE (OUTPATIENT)
Dept: SURGERY | Facility: CLINIC | Age: 29
End: 2024-06-07
Payer: COMMERCIAL

## 2024-06-07 ENCOUNTER — APPOINTMENT (OUTPATIENT)
Dept: INTERPRETER SERVICES | Facility: CLINIC | Age: 29
End: 2024-06-07
Payer: COMMERCIAL

## 2024-06-07 NOTE — TELEPHONE ENCOUNTER
Returning patients call with questions.  Patient was wondering if she doesn't want be sedated if they procedure can just be done with numbing medication.   Informed patient that we need to do sedation for her specific procedure (Lap Karin)  Patient understood.

## 2024-06-13 NOTE — PROGRESS NOTES
Assessment & Plan     Throat dryness  Halitosis   Gastroesophageal reflux disease, unspecified whether esophagitis present  6-12mo of ongoing throat dryness and mucus in the AM. Associated, constant halitosis. Initially concerned for post-nasal drip, but patient reports using Flonase regularly. No si/sx of oral infection - has established dental home and had cavities filled recently. She has acid reflux in the evening and reports recent episodes of abdominal cramping, which raises concerns for GERD, possibly 2/2 H pylori.   - Helicobacter pylori Antigen Stool; Future  - famotidine (PEPCID) 40 MG tablet; Take 1 tablet (40 mg) by mouth daily  - Follow-up in 2-4wks    External hemorrhoids  Resolved w/phenyl-eph ointment. Denies bloody and painful stools.     Hyperglycemia  Patient worried about diabetes. Random BG elevated in the past. She reports increased urine frequency and inability to gain weight despite how much she eats. BMI 19 today. May warrant further workup for malabsorption if A1c wnl.   - Hemoglobin A1c; Future  - Hemoglobin A1c    Symptomatic cholelithiasis  Periumbilical mass  Cholecystectomy and removal of  mass scheduled on  w/Dr. Lilly. Patient did not know she needed a pre-op visit.   - Scheduled for pre-op next week. Advised patient to bring in paperwork from surgery center to review and fill out.     Subjective   Belkis is a 28 year old, presenting for the following health issues:  hemrroids and mucus  (Dryness w/ mucus . On going issue )        2024     9:17 AM   Additional Questions   Roomed by Arabella borden   Accompanied by self         2024    Information    services provided? Yes   Language Hmong   Type of interpretation provided Face-to-face    name satish portillo    [employer]    phone number [phone]     HPI     External Hemorrhoid:  Last seen by Dr. Padron on 24. Using topical cream  "(phenyl-eph ointment).   Improvement? Yes - it's gone!  Pain? No   Bleeding? No     Dryness in throat w/mucus  5-6mo, mucus 1-2y  In the morning when she wakes up   Bad breath - constant  No abdominal pain, small nausea w/toothpaste???  Dentist - does go regularly, has cavities but has been fixed   Maybe some acid reflux   No dysphagia or odynophagia   No one else in family with these sx   Stooling x3-4/d, no obvious diarrhea  Cramps    Diabetes concerns:  Step dad   Eats a lot but does not gain weight  Increased urine frequency - 2-3x/h  Kidney stones     Review of Systems  Constitutional, HEENT, cardiovascular, pulmonary, gi and gu systems are negative, except as otherwise noted.      Objective    BP 98/64   Pulse 75   Temp 98.1  F (36.7  C) (Tympanic)   Ht 1.49 m (4' 10.66\")   Wt 44 kg (97 lb)   LMP 05/16/2024 (Approximate)   SpO2 97%   BMI 19.82 kg/m    Body mass index is 19.82 kg/m .  Physical Exam   GENERAL: alert and no distress  RESP: No respiratory distress, normal WOB  CV: appears well-perfused   : Nontender on palpation, nondistended  MS: no gross musculoskeletal defects noted, no edema          Signed Electronically by: Loco Montoya MD    "

## 2024-06-14 ENCOUNTER — OFFICE VISIT (OUTPATIENT)
Dept: FAMILY MEDICINE | Facility: CLINIC | Age: 29
End: 2024-06-14
Payer: COMMERCIAL

## 2024-06-14 VITALS
DIASTOLIC BLOOD PRESSURE: 64 MMHG | WEIGHT: 97 LBS | TEMPERATURE: 98.1 F | HEIGHT: 59 IN | SYSTOLIC BLOOD PRESSURE: 98 MMHG | BODY MASS INDEX: 19.56 KG/M2 | OXYGEN SATURATION: 97 % | HEART RATE: 75 BPM

## 2024-06-14 DIAGNOSIS — J39.2 THROAT DRYNESS: Primary | ICD-10-CM

## 2024-06-14 DIAGNOSIS — K21.9 GASTROESOPHAGEAL REFLUX DISEASE, UNSPECIFIED WHETHER ESOPHAGITIS PRESENT: ICD-10-CM

## 2024-06-14 DIAGNOSIS — K64.4 EXTERNAL HEMORRHOIDS: ICD-10-CM

## 2024-06-14 DIAGNOSIS — R19.6 HALITOSIS: ICD-10-CM

## 2024-06-14 DIAGNOSIS — R73.9 HYPERGLYCEMIA: ICD-10-CM

## 2024-06-14 DIAGNOSIS — K80.20 SYMPTOMATIC CHOLELITHIASIS: ICD-10-CM

## 2024-06-14 DIAGNOSIS — R19.05 PERIUMBILICAL MASS: ICD-10-CM

## 2024-06-14 LAB — HBA1C MFR BLD: 5.4 % (ref 0–5.6)

## 2024-06-14 PROCEDURE — 99213 OFFICE O/P EST LOW 20 MIN: CPT | Mod: GC

## 2024-06-14 PROCEDURE — 83036 HEMOGLOBIN GLYCOSYLATED A1C: CPT

## 2024-06-14 PROCEDURE — 36415 COLL VENOUS BLD VENIPUNCTURE: CPT

## 2024-06-14 RX ORDER — FAMOTIDINE 40 MG/1
40 TABLET, FILM COATED ORAL DAILY
Qty: 30 TABLET | Refills: 0 | Status: SHIPPED | OUTPATIENT
Start: 2024-06-14 | End: 2024-06-21

## 2024-06-14 NOTE — PROGRESS NOTES
I have personally reviewed the history and examination as documented by Dr. Monotya.  I was present during key portions of the visit and agree with the assessment and plan as documented for 28 yr old female with external hemorrhoids here for hemorrhoid follow-up, concern for DM, throat dryness/ mucus/ halatosis. Will check h pylori and tx for gerd. Precautions given. Anticipatory guidance given.     Chad Allison MD  June 14, 2024  9:43 AM

## 2024-06-19 PROCEDURE — 87338 HPYLORI STOOL AG IA: CPT

## 2024-06-20 LAB — H PYLORI AG STL QL IA: NEGATIVE

## 2024-06-21 ENCOUNTER — TELEPHONE (OUTPATIENT)
Dept: FAMILY MEDICINE | Facility: CLINIC | Age: 29
End: 2024-06-21

## 2024-06-21 ENCOUNTER — ANESTHESIA EVENT (OUTPATIENT)
Dept: SURGERY | Facility: AMBULATORY SURGERY CENTER | Age: 29
End: 2024-06-21
Payer: COMMERCIAL

## 2024-06-21 ENCOUNTER — OFFICE VISIT (OUTPATIENT)
Dept: FAMILY MEDICINE | Facility: CLINIC | Age: 29
End: 2024-06-21
Payer: COMMERCIAL

## 2024-06-21 VITALS
DIASTOLIC BLOOD PRESSURE: 68 MMHG | HEIGHT: 58 IN | BODY MASS INDEX: 20.65 KG/M2 | OXYGEN SATURATION: 96 % | HEART RATE: 86 BPM | RESPIRATION RATE: 18 BRPM | SYSTOLIC BLOOD PRESSURE: 112 MMHG | TEMPERATURE: 96.8 F | WEIGHT: 98.4 LBS

## 2024-06-21 DIAGNOSIS — Z01.818 PREOP GENERAL PHYSICAL EXAM: Primary | ICD-10-CM

## 2024-06-21 PROCEDURE — 99214 OFFICE O/P EST MOD 30 MIN: CPT | Mod: GC | Performed by: MASSAGE THERAPIST

## 2024-06-21 NOTE — H&P (VIEW-ONLY)
Preoperative Evaluation  [facility] [address]  [place] [address]  Phone: [phone]  Fax: [phone]  Primary Provider: Taj Padron MD  Pre-op Performing Provider: Taj Padron MD  2024                No data to display              Fax number for surgical facility: Note does not need to be faxed, will be available electronically in Epic.    Assessment & Plan     The proposed surgical procedure is considered INTERMEDIATE risk.    Preop general physical exam  Patient is low risk and cleared for surgery. She is sill considering if she wants to have the mass along her  scar removed.             - No identified additional risk factors other than previously addressed    Preoperative Medication Instructions  Antiplatelet or Anticoagulation Medication Instructions   - Patient is on no antiplatelet or anticoagulation medications.    Additional Medication Instructions  Take all scheduled medications on the day of surgery    Recommendation  Approval given to proceed with proposed procedure, without further diagnostic evaluation.    Alexis Tamayo is a 28 year old, presenting for the following:  Pre-Op Exam        HPI related to upcoming procedure:   ED visit for symptomatic cholethiasis on   Surgery virtual visit- , where CT results were discussed with her. Plan for laparoscopic cholecystectomy and excision of mass along her  scar.             2024   Pre-Op Questionnaire   Have you ever had a heart attack or stroke? No   Have you ever had surgery on your heart or blood vessels, such as a stent placement, a coronary artery bypass, or surgery on an artery in your head, neck, heart, or legs? No   Do you have chest pain with activity? No   Do you have a history of heart failure? No   Do you currently have a cold, bronchitis or symptoms of other infection? No   Do you have a cough, shortness of breath, or wheezing? No   Do you or  anyone in your family have previous history of blood clots? No   Do you or does anyone in your family have a serious bleeding problem such as prolonged bleeding following surgeries or cuts? (!) UNKNOWN    Have you ever had problems with anemia or been told to take iron pills? (!) YES - 2021 after delivery   Hgb on 5/25/24 normal   Have you had any abnormal blood loss such as black, tarry or bloody stools, or abnormal vaginal bleeding? No   Have you ever had a blood transfusion? No   Are you willing to have a blood transfusion if it is medically needed before, during, or after your surgery? Yes   Have you or any of your relatives ever had problems with anesthesia? No   Do you have sleep apnea, excessive snoring or daytime drowsiness? No   Do you have any artifical heart valves or other implanted medical devices like a pacemaker, defibrillator, or continuous glucose monitor? No   Do you have artificial joints? No   Are you allergic to latex? No      Health Care Directive  Patient does not have a Health Care Directive or Living Will: Discussed advance care planning with patient; however, patient declined at this time.    Preoperative Review of    reviewed - no record of controlled substances prescribed.          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  Past Surgical History:   Procedure Laterality Date    C/SECTION, CLASSICAL       No current outpatient medications on file.       No Known Allergies     Social History     Tobacco Use    Smoking status: Never     Passive exposure: Never    Smokeless tobacco: Never   Substance Use Topics    Alcohol use: Never     Family History   Problem Relation Age of Onset    No Known Problems Mother     No Known  "Problems Father     No Known Problems Maternal Grandmother     No Known Problems Maternal Grandfather     No Known Problems Paternal Grandmother     No Known Problems Paternal Grandfather     No Known Problems Brother     No Known Problems Sister     No Known Problems Son     No Known Problems Daughter     No Known Problems Maternal Half-Brother     No Known Problems Maternal Half-Sister     No Known Problems Paternal Half-Brother     No Known Problems Paternal Half-Sister     No Known Problems Niece     No Known Problems Nephew     No Known Problems Cousin     No Known Problems Other     Cancer No family hx of     Diabetes No family hx of     Coronary Artery Disease No family hx of     Heart Disease No family hx of     Hypertension No family hx of     Hyperlipidemia No family hx of     Kidney Disease No family hx of     Cerebrovascular Disease No family hx of     Obesity No family hx of     Thrombosis No family hx of     Asthma No family hx of     Arthritis No family hx of     Thyroid Disease No family hx of     Depression No family hx of     Mental Illness No family hx of     Substance Abuse No family hx of     Cystic Fibrosis No family hx of     Early Death No family hx of     Coronary Artery Disease Early Onset No family hx of     Heart Failure No family hx of     Bleeding Diathesis No family hx of     Dementia No family hx of     Breast Cancer No family hx of     Ovarian Cancer No family hx of     Uterine Cancer No family hx of     Prostate Cancer No family hx of     Colorectal Cancer No family hx of     Pancreatic Cancer No family hx of     Lung Cancer No family hx of     Melanoma No family hx of     Autoimmune Disease No family hx of     Unknown/Adopted No family hx of     Genetic Disorder No family hx of      History   Drug Use Unknown               Objective    /68   Pulse 86   Temp 96.8  F (36  C)   Resp 18   Ht 1.485 m (4' 10.47\")   Wt 44.6 kg (98 lb 6.4 oz)   LMP 05/16/2024 (Approximate)   " "SpO2 96%   BMI 20.24 kg/m     Estimated body mass index is 20.24 kg/m  as calculated from the following:    Height as of this encounter: 1.485 m (4' 10.47\").    Weight as of this encounter: 44.6 kg (98 lb 6.4 oz).  Physical Exam  EXAM  General: Alert, well-appearing, no acute distress  Head: Nontraumatic   Eyes:  EOM intact   Oropharynx: moist oral mucus membranes  Pulm: CTA BL, no tachypnea, speaking in full sentences  CV: RRR, no murmur  Abd: soft, NTND, negative garcia's. 1.5cm palpable nodule along center of  scar  Ext: Warm, well perfused,  no LE edema  Skin: No rash on limited skin exam  Neuro: CN II-XII intact, moves all 4 extremities  Psych: Mood appropriate to visit content, full affect, rational thought content and process      Recent Labs   Lab Test 24  0949 24  2225 24  0308   HGB  --  12.8 13.7   PLT  --  283 259   NA  --  139 135   POTASSIUM  --  4.0 3.5   CR  --  0.65 0.59   A1C 5.4  --   --         Diagnostics  No labs were ordered during this visit.   No EKG required, no history of coronary heart disease, significant arrhythmia, peripheral arterial disease or other structural heart disease.    Revised Cardiac Risk Index (RCRI)  The patient has the following serious cardiovascular risks for perioperative complications:   - No serious cardiac risks = 0 points     RCRI Interpretation: 0 points: Class I (very low risk - 0.4% complication rate)         Signed Electronically by: Taj Padron MD  Copy of this evaluation report is provided to requesting physician.         "

## 2024-06-21 NOTE — PROGRESS NOTES
I have personally reviewed the history and examination as documented by Dr. Padron.  I was present during key portions of the visit and agree with the assessment and plan as documented for 28 yr old female with hx of cholecystitis, ?endometrioma here for preop clearance for upcoming laparoscopy. Pt cleared to proceed. Precautions given. Anticipatory guidance given.     Chad Allison MD  June 21, 2024  10:49 AM

## 2024-06-21 NOTE — PROGRESS NOTES
Preoperative Evaluation  [facility] [address]  [place] [address]  Phone: [phone]  Fax: [phone]  Primary Provider: Taj Padron MD  Pre-op Performing Provider: Taj Padron MD  2024                No data to display              Fax number for surgical facility: Note does not need to be faxed, will be available electronically in Epic.    Assessment & Plan     The proposed surgical procedure is considered INTERMEDIATE risk.    Preop general physical exam  Patient is low risk and cleared for surgery. She is sill considering if she wants to have the mass along her  scar removed.             - No identified additional risk factors other than previously addressed    Preoperative Medication Instructions  Antiplatelet or Anticoagulation Medication Instructions   - Patient is on no antiplatelet or anticoagulation medications.    Additional Medication Instructions  Take all scheduled medications on the day of surgery    Recommendation  Approval given to proceed with proposed procedure, without further diagnostic evaluation.    Alexis Tamayo is a 28 year old, presenting for the following:  Pre-Op Exam        HPI related to upcoming procedure:   ED visit for symptomatic cholethiasis on   Surgery virtual visit- , where CT results were discussed with her. Plan for laparoscopic cholecystectomy and excision of mass along her  scar.             2024   Pre-Op Questionnaire   Have you ever had a heart attack or stroke? No   Have you ever had surgery on your heart or blood vessels, such as a stent placement, a coronary artery bypass, or surgery on an artery in your head, neck, heart, or legs? No   Do you have chest pain with activity? No   Do you have a history of heart failure? No   Do you currently have a cold, bronchitis or symptoms of other infection? No   Do you have a cough, shortness of breath, or wheezing? No   Do you or  anyone in your family have previous history of blood clots? No   Do you or does anyone in your family have a serious bleeding problem such as prolonged bleeding following surgeries or cuts? (!) UNKNOWN    Have you ever had problems with anemia or been told to take iron pills? (!) YES - 2021 after delivery   Hgb on 5/25/24 normal   Have you had any abnormal blood loss such as black, tarry or bloody stools, or abnormal vaginal bleeding? No   Have you ever had a blood transfusion? No   Are you willing to have a blood transfusion if it is medically needed before, during, or after your surgery? Yes   Have you or any of your relatives ever had problems with anesthesia? No   Do you have sleep apnea, excessive snoring or daytime drowsiness? No   Do you have any artifical heart valves or other implanted medical devices like a pacemaker, defibrillator, or continuous glucose monitor? No   Do you have artificial joints? No   Are you allergic to latex? No      Health Care Directive  Patient does not have a Health Care Directive or Living Will: Discussed advance care planning with patient; however, patient declined at this time.    Preoperative Review of    reviewed - no record of controlled substances prescribed.          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  Past Surgical History:   Procedure Laterality Date    C/SECTION, CLASSICAL       No current outpatient medications on file.       No Known Allergies     Social History     Tobacco Use    Smoking status: Never     Passive exposure: Never    Smokeless tobacco: Never   Substance Use Topics    Alcohol use: Never     Family History   Problem Relation Age of Onset    No Known Problems Mother     No Known  "Problems Father     No Known Problems Maternal Grandmother     No Known Problems Maternal Grandfather     No Known Problems Paternal Grandmother     No Known Problems Paternal Grandfather     No Known Problems Brother     No Known Problems Sister     No Known Problems Son     No Known Problems Daughter     No Known Problems Maternal Half-Brother     No Known Problems Maternal Half-Sister     No Known Problems Paternal Half-Brother     No Known Problems Paternal Half-Sister     No Known Problems Niece     No Known Problems Nephew     No Known Problems Cousin     No Known Problems Other     Cancer No family hx of     Diabetes No family hx of     Coronary Artery Disease No family hx of     Heart Disease No family hx of     Hypertension No family hx of     Hyperlipidemia No family hx of     Kidney Disease No family hx of     Cerebrovascular Disease No family hx of     Obesity No family hx of     Thrombosis No family hx of     Asthma No family hx of     Arthritis No family hx of     Thyroid Disease No family hx of     Depression No family hx of     Mental Illness No family hx of     Substance Abuse No family hx of     Cystic Fibrosis No family hx of     Early Death No family hx of     Coronary Artery Disease Early Onset No family hx of     Heart Failure No family hx of     Bleeding Diathesis No family hx of     Dementia No family hx of     Breast Cancer No family hx of     Ovarian Cancer No family hx of     Uterine Cancer No family hx of     Prostate Cancer No family hx of     Colorectal Cancer No family hx of     Pancreatic Cancer No family hx of     Lung Cancer No family hx of     Melanoma No family hx of     Autoimmune Disease No family hx of     Unknown/Adopted No family hx of     Genetic Disorder No family hx of      History   Drug Use Unknown               Objective    /68   Pulse 86   Temp 96.8  F (36  C)   Resp 18   Ht 1.485 m (4' 10.47\")   Wt 44.6 kg (98 lb 6.4 oz)   LMP 05/16/2024 (Approximate)   " "SpO2 96%   BMI 20.24 kg/m     Estimated body mass index is 20.24 kg/m  as calculated from the following:    Height as of this encounter: 1.485 m (4' 10.47\").    Weight as of this encounter: 44.6 kg (98 lb 6.4 oz).  Physical Exam  EXAM  General: Alert, well-appearing, no acute distress  Head: Nontraumatic   Eyes:  EOM intact   Oropharynx: moist oral mucus membranes  Pulm: CTA BL, no tachypnea, speaking in full sentences  CV: RRR, no murmur  Abd: soft, NTND, negative garcia's. 1.5cm palpable nodule along center of  scar  Ext: Warm, well perfused,  no LE edema  Skin: No rash on limited skin exam  Neuro: CN II-XII intact, moves all 4 extremities  Psych: Mood appropriate to visit content, full affect, rational thought content and process      Recent Labs   Lab Test 24  0949 24  2225 24  0308   HGB  --  12.8 13.7   PLT  --  283 259   NA  --  139 135   POTASSIUM  --  4.0 3.5   CR  --  0.65 0.59   A1C 5.4  --   --         Diagnostics  No labs were ordered during this visit.   No EKG required, no history of coronary heart disease, significant arrhythmia, peripheral arterial disease or other structural heart disease.    Revised Cardiac Risk Index (RCRI)  The patient has the following serious cardiovascular risks for perioperative complications:   - No serious cardiac risks = 0 points     RCRI Interpretation: 0 points: Class I (very low risk - 0.4% complication rate)         Signed Electronically by: Taj Padron MD  Copy of this evaluation report is provided to requesting physician.         "

## 2024-06-21 NOTE — PATIENT INSTRUCTIONS

## 2024-06-21 NOTE — TELEPHONE ENCOUNTER
[facility] phone call message- general phone call:    Reason for call:     Patient call asking for a note/ letter stating that she does not have depression anymore. Patient advise she had a history of depression and needing a letter to state she does not have depression anymore.    No other information given     Return call needed: No    OK to leave a message on voice mail? Yes    Primary language: Hmong      needed? Yes    Call taken on June 21, 2024 at 11:46 AM by Arabella lAas

## 2024-06-24 ENCOUNTER — ANESTHESIA (OUTPATIENT)
Dept: SURGERY | Facility: AMBULATORY SURGERY CENTER | Age: 29
End: 2024-06-24
Payer: COMMERCIAL

## 2024-06-24 ENCOUNTER — NURSE TRIAGE (OUTPATIENT)
Dept: NURSING | Facility: CLINIC | Age: 29
End: 2024-06-24

## 2024-06-24 ENCOUNTER — HOSPITAL ENCOUNTER (OUTPATIENT)
Facility: AMBULATORY SURGERY CENTER | Age: 29
Discharge: HOME | End: 2024-06-24
Attending: SURGERY
Payer: COMMERCIAL

## 2024-06-24 VITALS
WEIGHT: 98 LBS | HEART RATE: 66 BPM | DIASTOLIC BLOOD PRESSURE: 71 MMHG | SYSTOLIC BLOOD PRESSURE: 112 MMHG | TEMPERATURE: 96.9 F | HEIGHT: 58 IN | OXYGEN SATURATION: 97 % | RESPIRATION RATE: 20 BRPM | BODY MASS INDEX: 20.57 KG/M2

## 2024-06-24 DIAGNOSIS — K80.20 SYMPTOMATIC CHOLELITHIASIS: ICD-10-CM

## 2024-06-24 DIAGNOSIS — N80.129 ENDOMETRIOMA: ICD-10-CM

## 2024-06-24 LAB
HCG UR QL: NEGATIVE
INTERNAL QC OK POCT: NORMAL
POCT KIT EXPIRATION DATE: NORMAL
POCT KIT LOT NUMBER: NORMAL

## 2024-06-24 PROCEDURE — 49203 PR EXCISION/DESTRUCTION OPEN ABDOMINAL TUMORS 5 CM: CPT | Performed by: SURGERY

## 2024-06-24 PROCEDURE — 47562 LAPAROSCOPIC CHOLECYSTECTOMY: CPT | Mod: 51 | Performed by: SURGERY

## 2024-06-24 RX ORDER — FENTANYL CITRATE 0.05 MG/ML
25 INJECTION, SOLUTION INTRAMUSCULAR; INTRAVENOUS
Status: DISCONTINUED | OUTPATIENT
Start: 2024-06-24 | End: 2024-06-25 | Stop reason: HOSPADM

## 2024-06-24 RX ORDER — ACETAMINOPHEN 325 MG/1
975 TABLET ORAL ONCE
Status: COMPLETED | OUTPATIENT
Start: 2024-06-24 | End: 2024-06-24

## 2024-06-24 RX ORDER — ONDANSETRON 2 MG/ML
4 INJECTION INTRAMUSCULAR; INTRAVENOUS EVERY 30 MIN PRN
Status: DISCONTINUED | OUTPATIENT
Start: 2024-06-24 | End: 2024-06-25 | Stop reason: HOSPADM

## 2024-06-24 RX ORDER — HYDROMORPHONE HCL IN WATER/PF 6 MG/30 ML
0.2 PATIENT CONTROLLED ANALGESIA SYRINGE INTRAVENOUS EVERY 5 MIN PRN
Status: DISCONTINUED | OUTPATIENT
Start: 2024-06-24 | End: 2024-06-25 | Stop reason: HOSPADM

## 2024-06-24 RX ORDER — LIDOCAINE HYDROCHLORIDE 20 MG/ML
INJECTION, SOLUTION INFILTRATION; PERINEURAL PRN
Status: DISCONTINUED | OUTPATIENT
Start: 2024-06-24 | End: 2024-06-24

## 2024-06-24 RX ORDER — GLYCOPYRROLATE 0.2 MG/ML
INJECTION, SOLUTION INTRAMUSCULAR; INTRAVENOUS PRN
Status: DISCONTINUED | OUTPATIENT
Start: 2024-06-24 | End: 2024-06-24

## 2024-06-24 RX ORDER — FENTANYL CITRATE 50 UG/ML
INJECTION, SOLUTION INTRAMUSCULAR; INTRAVENOUS PRN
Status: DISCONTINUED | OUTPATIENT
Start: 2024-06-24 | End: 2024-06-24

## 2024-06-24 RX ORDER — BUPIVACAINE HYDROCHLORIDE AND EPINEPHRINE 2.5; 5 MG/ML; UG/ML
INJECTION, SOLUTION INFILTRATION; PERINEURAL PRN
Status: DISCONTINUED | OUTPATIENT
Start: 2024-06-24 | End: 2024-06-24 | Stop reason: HOSPADM

## 2024-06-24 RX ORDER — ONDANSETRON 4 MG/1
4 TABLET, ORALLY DISINTEGRATING ORAL EVERY 30 MIN PRN
Status: DISCONTINUED | OUTPATIENT
Start: 2024-06-24 | End: 2024-06-25 | Stop reason: HOSPADM

## 2024-06-24 RX ORDER — DEXAMETHASONE SODIUM PHOSPHATE 4 MG/ML
4 INJECTION, SOLUTION INTRA-ARTICULAR; INTRALESIONAL; INTRAMUSCULAR; INTRAVENOUS; SOFT TISSUE
Status: DISCONTINUED | OUTPATIENT
Start: 2024-06-24 | End: 2024-06-25 | Stop reason: HOSPADM

## 2024-06-24 RX ORDER — SODIUM CHLORIDE, SODIUM LACTATE, POTASSIUM CHLORIDE, CALCIUM CHLORIDE 600; 310; 30; 20 MG/100ML; MG/100ML; MG/100ML; MG/100ML
INJECTION, SOLUTION INTRAVENOUS CONTINUOUS
Status: DISCONTINUED | OUTPATIENT
Start: 2024-06-24 | End: 2024-06-25 | Stop reason: HOSPADM

## 2024-06-24 RX ORDER — FENTANYL CITRATE 0.05 MG/ML
25 INJECTION, SOLUTION INTRAMUSCULAR; INTRAVENOUS EVERY 5 MIN PRN
Status: DISCONTINUED | OUTPATIENT
Start: 2024-06-24 | End: 2024-06-25 | Stop reason: HOSPADM

## 2024-06-24 RX ORDER — CEFAZOLIN SODIUM 2 G/100ML
2 INJECTION, SOLUTION INTRAVENOUS
Status: COMPLETED | OUTPATIENT
Start: 2024-06-24 | End: 2024-06-24

## 2024-06-24 RX ORDER — KETOROLAC TROMETHAMINE 30 MG/ML
INJECTION, SOLUTION INTRAMUSCULAR; INTRAVENOUS PRN
Status: DISCONTINUED | OUTPATIENT
Start: 2024-06-24 | End: 2024-06-24

## 2024-06-24 RX ORDER — OXYCODONE HYDROCHLORIDE 5 MG/1
5 TABLET ORAL
Status: DISCONTINUED | OUTPATIENT
Start: 2024-06-24 | End: 2024-06-25 | Stop reason: HOSPADM

## 2024-06-24 RX ORDER — ONDANSETRON 2 MG/ML
INJECTION INTRAMUSCULAR; INTRAVENOUS PRN
Status: DISCONTINUED | OUTPATIENT
Start: 2024-06-24 | End: 2024-06-24

## 2024-06-24 RX ORDER — NALOXONE HYDROCHLORIDE 0.4 MG/ML
0.1 INJECTION, SOLUTION INTRAMUSCULAR; INTRAVENOUS; SUBCUTANEOUS
Status: DISCONTINUED | OUTPATIENT
Start: 2024-06-24 | End: 2024-06-25 | Stop reason: HOSPADM

## 2024-06-24 RX ORDER — PROPOFOL 10 MG/ML
INJECTION, EMULSION INTRAVENOUS CONTINUOUS PRN
Status: DISCONTINUED | OUTPATIENT
Start: 2024-06-24 | End: 2024-06-24

## 2024-06-24 RX ORDER — OXYCODONE HYDROCHLORIDE 10 MG/1
10 TABLET ORAL
Status: DISCONTINUED | OUTPATIENT
Start: 2024-06-24 | End: 2024-06-25 | Stop reason: HOSPADM

## 2024-06-24 RX ORDER — HYDROMORPHONE HCL IN WATER/PF 6 MG/30 ML
0.4 PATIENT CONTROLLED ANALGESIA SYRINGE INTRAVENOUS EVERY 5 MIN PRN
Status: DISCONTINUED | OUTPATIENT
Start: 2024-06-24 | End: 2024-06-25 | Stop reason: HOSPADM

## 2024-06-24 RX ORDER — OXYCODONE HYDROCHLORIDE 5 MG/1
5 TABLET ORAL EVERY 6 HOURS PRN
Qty: 12 TABLET | Refills: 0 | Status: SHIPPED | OUTPATIENT
Start: 2024-06-24 | End: 2024-06-27

## 2024-06-24 RX ORDER — MEPERIDINE HYDROCHLORIDE 25 MG/ML
12.5 INJECTION INTRAMUSCULAR; INTRAVENOUS; SUBCUTANEOUS EVERY 5 MIN PRN
Status: DISCONTINUED | OUTPATIENT
Start: 2024-06-24 | End: 2024-06-25 | Stop reason: HOSPADM

## 2024-06-24 RX ORDER — CEFAZOLIN SODIUM 2 G/100ML
2 INJECTION, SOLUTION INTRAVENOUS SEE ADMIN INSTRUCTIONS
Status: DISCONTINUED | OUTPATIENT
Start: 2024-06-24 | End: 2024-06-25 | Stop reason: HOSPADM

## 2024-06-24 RX ORDER — DEXAMETHASONE SODIUM PHOSPHATE 4 MG/ML
INJECTION, SOLUTION INTRA-ARTICULAR; INTRALESIONAL; INTRAMUSCULAR; INTRAVENOUS; SOFT TISSUE PRN
Status: DISCONTINUED | OUTPATIENT
Start: 2024-06-24 | End: 2024-06-24

## 2024-06-24 RX ORDER — FENTANYL CITRATE 0.05 MG/ML
50 INJECTION, SOLUTION INTRAMUSCULAR; INTRAVENOUS EVERY 5 MIN PRN
Status: DISCONTINUED | OUTPATIENT
Start: 2024-06-24 | End: 2024-06-25 | Stop reason: HOSPADM

## 2024-06-24 RX ORDER — PROPOFOL 10 MG/ML
INJECTION, EMULSION INTRAVENOUS PRN
Status: DISCONTINUED | OUTPATIENT
Start: 2024-06-24 | End: 2024-06-24

## 2024-06-24 RX ORDER — LIDOCAINE 40 MG/G
CREAM TOPICAL
Status: DISCONTINUED | OUTPATIENT
Start: 2024-06-24 | End: 2024-06-25 | Stop reason: HOSPADM

## 2024-06-24 RX ADMIN — ONDANSETRON 4 MG: 2 INJECTION INTRAMUSCULAR; INTRAVENOUS at 11:46

## 2024-06-24 RX ADMIN — Medication 20 MG: at 11:14

## 2024-06-24 RX ADMIN — DEXAMETHASONE SODIUM PHOSPHATE 8 MG: 4 INJECTION, SOLUTION INTRA-ARTICULAR; INTRALESIONAL; INTRAMUSCULAR; INTRAVENOUS; SOFT TISSUE at 11:18

## 2024-06-24 RX ADMIN — SODIUM CHLORIDE, SODIUM LACTATE, POTASSIUM CHLORIDE, CALCIUM CHLORIDE: 600; 310; 30; 20 INJECTION, SOLUTION INTRAVENOUS at 10:31

## 2024-06-24 RX ADMIN — Medication 10 MG: at 11:23

## 2024-06-24 RX ADMIN — FENTANYL CITRATE 100 MCG: 50 INJECTION, SOLUTION INTRAMUSCULAR; INTRAVENOUS at 11:14

## 2024-06-24 RX ADMIN — KETOROLAC TROMETHAMINE 15 MG: 30 INJECTION, SOLUTION INTRAMUSCULAR; INTRAVENOUS at 11:56

## 2024-06-24 RX ADMIN — CEFAZOLIN SODIUM 2 G: 2 INJECTION, SOLUTION INTRAVENOUS at 11:06

## 2024-06-24 RX ADMIN — LIDOCAINE HYDROCHLORIDE 2.5 ML: 20 INJECTION, SOLUTION INFILTRATION; PERINEURAL at 11:13

## 2024-06-24 RX ADMIN — ACETAMINOPHEN 975 MG: 325 TABLET ORAL at 13:10

## 2024-06-24 RX ADMIN — PROPOFOL 150 MG: 10 INJECTION, EMULSION INTRAVENOUS at 11:13

## 2024-06-24 RX ADMIN — GLYCOPYRROLATE 0.2 MG: 0.2 INJECTION, SOLUTION INTRAMUSCULAR; INTRAVENOUS at 11:13

## 2024-06-24 RX ADMIN — PROPOFOL 180 MCG/KG/MIN: 10 INJECTION, EMULSION INTRAVENOUS at 11:15

## 2024-06-24 NOTE — INTERVAL H&P NOTE
I have reviewed the surgical (or preoperative) H&P that is linked to this encounter, and examined the patient. There are no significant changes    David Lilly MD, FACS  Office: [phone]  [facility]   General and Bariatric Surgery      Clinical Conditions Present on Arrival:  Clinically Significant Risk Factors Present on Admission

## 2024-06-24 NOTE — ANESTHESIA POSTPROCEDURE EVALUATION
Patient: eBlkis Gibraltarian    Procedure: Procedure(s):  CHOLECYSTECTOMY, LAPAROSCOPIC AND EXCISION OF MASS FROM  SECTION SCAR       Anesthesia Type:  General    Note:  Disposition: Outpatient   Postop Pain Control: Uneventful            Sign Out: Well controlled pain   PONV: No   Neuro/Psych: Uneventful            Sign Out: Acceptable/Baseline neuro status   Airway/Respiratory: Uneventful            Sign Out: Acceptable/Baseline resp. status   CV/Hemodynamics: Uneventful            Sign Out: Acceptable CV status; No obvious hypovolemia; No obvious fluid overload   Other NRE: NONE   DID A NON-ROUTINE EVENT OCCUR? No           Last vitals:  Vitals Value Taken Time   /77 24 1300   Temp 97.1  F (36.2  C) 24 1206   Pulse 70 24 1300   Resp 20 24 1206   SpO2 100 % 24 1300   Vitals shown include unfiled device data.    Electronically Signed By: Michael Robbins MD  2024  1:13 PM

## 2024-06-24 NOTE — ANESTHESIA CARE TRANSFER NOTE
Patient: Belkis Daniel    Procedure: Procedure(s):  CHOLECYSTECTOMY, LAPAROSCOPIC AND EXCISION OF MASS FROM  SECTION SCAR       Diagnosis: Symptomatic cholelithiasis [K80.20]  Endometrioma [N80.129]  Diagnosis Additional Information: No value filed.    Anesthesia Type:   General     Note:    Oropharynx: oropharynx clear of all foreign objects  Level of Consciousness: drowsy  Oxygen Supplementation: face mask  Level of Supplemental Oxygen (L/min / FiO2): 6  Independent Airway: airway patency satisfactory and stable  Dentition: dentition unchanged  Vital Signs Stable: post-procedure vital signs reviewed and stable  Report to RN Given: handoff report given  Patient transferred to: PACU    Handoff Report: Identifed the Patient, Identified the Reponsible Provider, Reviewed the pertinent medical history, Discussed the surgical course, Reviewed Intra-OP anesthesia mangement and issues during anesthesia, Set expectations for post-procedure period and Allowed opportunity for questions and acknowledgement of understanding      Vitals:  Vitals Value Taken Time   /65 24 1206   Temp 97.1  F (36.2  C) 24 1206   Pulse 88 24 1206   Resp 20 24 1206   SpO2 100 % 24 1206       Electronically Signed By: ADRIENNE Alvares CRNA  2024  12:08 PM

## 2024-06-24 NOTE — ANESTHESIA PROCEDURE NOTES
Airway       Patient location during procedure: OR       Procedure Start/Stop Times: 6/24/2024 11:16 AM  Staff -        CRNA: Darrin Hickey APRN CRNA       Performed By: CRNA  Consent for Airway        Urgency: elective  Indications and Patient Condition       Indications for airway management: joyce-procedural       Induction type:intravenous       Mask difficulty assessment: 1 - vent by mask    Final Airway Details       Final airway type: endotracheal airway       Successful airway: ETT - single and Oral  Endotracheal Airway Details        ETT size (mm): 6.5       Cuffed: yes       Successful intubation technique: direct laryngoscopy       DL Blade Type: Sanchez 2       Grade View of Cords: 1       Adjucts: stylet       Position: Right       Measured from: gums/teeth       Secured at (cm): 20       Bite block used: None    Post intubation assessment        Placement verified by: capnometry, equal breath sounds and chest rise        Number of attempts at approach: 1       Number of other approaches attempted: 0       Secured with: tape       Ease of procedure: easy       Dentition: Intact and Unchanged       Dental guard used and removed. Dental Guard Type: Proguard Red.    Medication(s) Administered   Medication Administration Time: 6/24/2024 11:16 AM

## 2024-06-24 NOTE — OP NOTE
[facility]  Operative Note    Pre-operative diagnosis: Symptomatic cholelithiasis [K80.20]  Endometrioma [N80.129]   Post-operative diagnosis Biliary colic; incisional endometrioma   Procedure: Procedure(s):  CHOLECYSTECTOMY, LAPAROSCOPIC AND EXCISION OF MASS FROM  SECTION SCAR   Surgeon: David Lilly MD   Assistants(s): None   Anesthesia: General Anesthetic    Estimated blood loss: 30 ml                Drains: None   Specimens: Gallbladder  Endometrioma       Findings: None.   Complications: None.           Description of procedure:      The patient was brought to the operating room where after induction of general anesthesia with endotracheal and the patient was positioned with the left arm tucked and right arm out.  The patient was then prepped and draped in standard sterile fashion.  After a procedural pause we began by injecting quarter percent Marcaine into the skin immediately adjacent to the umbilicus.  This was then sharply incised.  We then entered with a 5 mm optical trochar.  The patient was then placed in reverse Trendelenburg and right side up the body positioning.  We then proceeded to place 3 additional trochars across the right subcostal margin.      On initial evaluation the gallbladder it appeared distended but not inflamed.   We began our operation by grasping the fundus the gallbladder and retracting it cephalad over the dome of the liver.  The neck of the gallbladder was then retracted lateral to the right side.  We then began by scoring the peritoneum overlying the triangle of Calot. Using primarily blunt dissection and electrocautery we proceeded to clear the fatty tissues and lymph node away from the triangle of Calot. The cystic duct and cystic artery were skeletonized. A critical view was obtained.  We then proceeded to place three clips each on the cystic artery and duct, which were then divided.  We then utilized electrocautery to dissect the remainder of the  gallbladder off the gallbladder fossa. Once this was completely removed we inspected the gallbladder fossa for hemostasis which appeared excellent. The gallbladder was then placed into an Endo Catch bag. All spilled fluid and blood were then aspirated clear from the right upper quadrant and after confirming no active bleeding from the gallbladder fossa the Endo Catch bag with the gallbladder intact was removed through the 12 mm port site. An 0 Vicryl stitch was then placed under laparoscopic guidance in the 12 mm port site.  We then desufflated the abdomen and removed our ports. The preplaced fascial tie was then tied down with excellent obliteration of the fascial defect. The remainder of the local anesthetic was then injected in the skin and fascia surrounding the port sites and the skin closed with running 4-0 subcuticular sutures.    We next turned our attention to the endometrioma implant at the  scar.  A 5 cm incision was made directly overlying the palpable abdominal wall mass.  Utilizing electrocautery we then dissected down to and around the endometrioma which was implanted in the abdominal wall at the level of the fascia.  This was excised, measuring roughly 1.5 to 2 cm in size.  After ensuring adequate hemostasis the fascial defect was closed with a running 0 PDS suture.  The skin was then closed with running 4-0 Vicryl subcuticular suture.          David Lilly MD, FACS  Office: [phone]  [facility]   General and Bariatric Surgery

## 2024-06-24 NOTE — TELEPHONE ENCOUNTER
Patient had surgery today at [facility] Maxx Hernandez MD.  Today patient is requesting to speak with on call MD.  FNA warm transferred caller through to page  to page the  on call MD to herself.   Patient has question about sleeping on her back.      Reason for Disposition   Health Information question, no triage required and triager able to answer question    Additional Information   Negative: [1] Caller is not with the adult (patient) AND [2] reporting urgent symptoms   Negative: Lab result questions   Negative: Medication questions   Negative: Caller can't be reached by phone   Negative: Caller has already spoken to PCP or another triager   Negative: RN needs further essential information from caller in order to complete triage   Negative: Requesting regular office appointment   Negative: [1] Caller requesting NON-URGENT health information AND [2] PCP's office is the best resource    Protocols used: Information Only Call - No Triage-[facility]

## 2024-06-24 NOTE — DISCHARGE INSTRUCTIONS
If you have any questions or concerns regarding your procedure, please contact Dr. Lilly, his office number is [phone].       You received a dose of IV Toradol at 11:56AM. Please do not take any additional Ibuprofen/NSAID products (Aleve/Advil/Motrin/Naproxen/Celebrex) until after 5:56PM.      You have received 975 mg of Acetaminophen (Tylenol) at 1:10PM. Please do not take an additional dose of Tylenol until after 7:10PM     Do not exceed 4,000 mg of acetaminophen during a 24 hour period and keep in mind that acetaminophen can also be found in many over-the-counter cold medications as well as narcotics that may be given for pain.     [facility]   Adult Discharge Orders & Instructions     For 24 hours after surgery    Get plenty of rest.  A responsible adult must stay with you for at least 24 hours after you leave the hospital.   Do not drive or use heavy equipment.  If you have weakness or tingling, don't drive or use heavy equipment until this feeling goes away.  Do not drink alcohol.  Avoid strenuous or risky activities.  Ask for help when climbing stairs.   You may feel lightheaded.  IF so, sit for a few minutes before standing.  Have someone help you get up.   If you have nausea (feel sick to your stomach): Drink only clear liquids such as apple juice, ginger ale, broth or 7-Up.  Rest may also help.  Be sure to drink enough fluids.  Move to a regular diet as you feel able.  You may have a slight fever. Call the doctor if your fever is over 100 F (37.7 C) (taken under the tongue) or lasts longer than 24 hours.  You may have a dry mouth, a sore throat, muscle aches or trouble sleeping.  These should go away after 24 hours.  Do not make important or legal decisions.   Call your doctor for any of the followin.  Signs of infection (fever, growing tenderness at the surgery site, a large amount of drainage or bleeding, severe pain, foul-smelling drainage, redness, swelling).    2. It has  been over 8 to 10 hours since surgery and you are still not able to urinate (pass water).    3.  Headache for over 24 hours.

## 2024-06-24 NOTE — ANESTHESIA PREPROCEDURE EVALUATION
Anesthesia Pre-Procedure Evaluation    Patient: Belkis Sanchez   MRN: [id number] : 1995        Procedure : Procedure(s):  CHOLECYSTECTOMY, LAPAROSCOPIC, EXCISION, MASS, C section scar  EXCISION, MASS, C section scar          Past Medical History:   Diagnosis Date    Anemia     Motion sickness       Past Surgical History:   Procedure Laterality Date    C/SECTION, CLASSICAL        No Known Allergies   Social History     Tobacco Use    Smoking status: Never     Passive exposure: Never    Smokeless tobacco: Never   Substance Use Topics    Alcohol use: Never      Wt Readings from Last 1 Encounters:   24 44.5 kg (98 lb)        Anesthesia Evaluation   Pt has not had prior anesthetic         ROS/MED HX  ENT/Pulmonary:  - neg pulmonary ROS     Neurologic:  - neg neurologic ROS     Cardiovascular:  - neg cardiovascular ROS     METS/Exercise Tolerance: >4 METS    Hematologic:  - neg hematologic  ROS   (+)      anemia,          Musculoskeletal:  - neg musculoskeletal ROS     GI/Hepatic:  - neg GI/hepatic ROS   (+)          cholecystitis/cholelithiasis,          Renal/Genitourinary:  - neg Renal ROS     Endo:  - neg endo ROS     Psychiatric/Substance Use: Comment: H/o postpartum psychosis - neg psychiatric ROS     Infectious Disease:  - neg infectious disease ROS     Malignancy:  - neg malignancy ROS     Other:  - neg other ROS          Physical Exam    Airway        Mallampati: I   TM distance: > 3 FB   Neck ROM: full   Mouth opening: > 3 cm    Respiratory Devices and Support         Dental       (+) Minor Abnormalities - some fillings, tiny chips      Cardiovascular   cardiovascular exam normal          Pulmonary   pulmonary exam normal                OUTSIDE LABS:  CBC:   Lab Results   Component Value Date    WBC 9.0 2024    WBC 14.2 (H) 2024    HGB 12.8 2024    HGB 13.7 2024    HCT 39.7 2024    HCT 42.0 2024     2024     2024     BMP:   Lab Results  "  Component Value Date     05/25/2024     04/11/2024    POTASSIUM 4.0 05/25/2024    POTASSIUM 3.5 04/11/2024    CHLORIDE 104 05/25/2024    CHLORIDE 100 04/11/2024    CO2 26 05/25/2024    CO2 24 04/11/2024    BUN 11.1 05/25/2024    BUN 19.3 04/11/2024    CR 0.65 05/25/2024    CR 0.59 04/11/2024     (H) 05/25/2024     (H) 04/11/2024     COAGS: No results found for: \"PTT\", \"INR\", \"FIBR\"  POC:   Lab Results   Component Value Date    HCG Negative 06/24/2024    HCGS Negative 04/11/2024     HEPATIC:   Lab Results   Component Value Date    ALBUMIN 4.3 04/11/2024    PROTTOTAL 7.9 04/11/2024    ALT 9 04/11/2024    AST 17 04/11/2024    ALKPHOS 63 04/11/2024    BILITOTAL 0.8 04/11/2024     OTHER:   Lab Results   Component Value Date    A1C 5.4 06/14/2024    DENISSE 9.0 05/25/2024    LIPASE 42 04/11/2024       Anesthesia Plan    ASA Status:  2    NPO Status:  NPO Appropriate    Anesthesia Type: General.     - Airway: ETT   Induction: Propofol, Intravenous.   Maintenance: TIVA.        Consents    Anesthesia Plan(s) and associated risks, benefits, and realistic alternatives discussed. Questions answered and patient/representative(s) expressed understanding.     - Discussed:     - Discussed with:  Patient,             Postoperative Care    Pain management: Multi-modal analgesia.   PONV prophylaxis: Ondansetron (or other 5HT-3), Dexamethasone or Solumedrol     Comments:    Other Comments: Reviewed anesthetic options and risks, including risk of dental trauma. Patient agrees to proceed.            Michael Robbins MD    I have reviewed the pertinent notes and labs in the chart from the past 30 days and (re)examined the patient.  Any updates or changes from those notes are reflected in this note.                  "

## 2024-06-26 ENCOUNTER — TELEPHONE (OUTPATIENT)
Dept: FAMILY MEDICINE | Facility: CLINIC | Age: 29
End: 2024-06-26
Payer: COMMERCIAL

## 2024-06-26 NOTE — TELEPHONE ENCOUNTER
Pt calling with  regarding recent procedure 6/24.    4 incision on chest when she talks loud it hurts.    Pt wondering if PCP can write a letter stating she can not return to work until July 8th because she is having soreness.     Pt reports last BM 6/24, she denies abdominal pain/discomfort. She is requesting a stool softener sent to the following pharmacy.    [facility] [address] [place] [address]  #[phone]    Pt requesting sister, Yael to  pt letter at .    Pt would like a call back when the letter is completed.    Will route to PCP to advise.    ANGIE Oneil.

## 2024-06-27 ENCOUNTER — TELEPHONE (OUTPATIENT)
Dept: SURGERY | Facility: CLINIC | Age: 29
End: 2024-06-27
Payer: COMMERCIAL

## 2024-06-27 ENCOUNTER — TELEPHONE (OUTPATIENT)
Dept: FAMILY MEDICINE | Facility: CLINIC | Age: 29
End: 2024-06-27
Payer: COMMERCIAL

## 2024-06-27 DIAGNOSIS — K59.03 DRUG-INDUCED CONSTIPATION: Primary | ICD-10-CM

## 2024-06-27 RX ORDER — DOCUSATE SODIUM 100 MG/1
100 CAPSULE, LIQUID FILLED ORAL 2 TIMES DAILY
Qty: 15 CAPSULE | Refills: 0 | Status: SHIPPED | OUTPATIENT
Start: 2024-06-27

## 2024-06-27 NOTE — TELEPHONE ENCOUNTER
RN called patient to relay provider message below. Also informed patient that Rx of Colace was sent to the pharmacy. Patient verbalized good understanding.    She will have her sister come to the clinic to  the work letter. Work letter printed and given to  staff.    Tita GEORGES RN, BSN

## 2024-06-27 NOTE — TELEPHONE ENCOUNTER
Patient called wondering what her weight limit is for carrying. Pt states will need a letter stating weight restrictions and for how long as well.

## 2024-06-27 NOTE — TELEPHONE ENCOUNTER
[facility] Post-Op Phone Call                     Surgeon: David Lilly MD    Surgery: Laparoscopic Cholecystectomy and excision of mass from  section scar    Date of Surgery: 2024  Discharge Date: 2024    Date/Time Called:   Date: 2024 Time: 9:05 AM   Attempt: First    RN called patient with [employer]  to complete post-op call. No answer and left message for patient to call clinic for any questions or concerns regarding recovery.    Tita GEORGES RN, BSN

## 2024-06-29 ENCOUNTER — APPOINTMENT (OUTPATIENT)
Dept: INTERPRETER SERVICES | Facility: CLINIC | Age: 29
End: 2024-06-29
Payer: COMMERCIAL

## 2024-06-29 ENCOUNTER — NURSE TRIAGE (OUTPATIENT)
Dept: NURSING | Facility: CLINIC | Age: 29
End: 2024-06-29
Payer: COMMERCIAL

## 2024-06-29 NOTE — TELEPHONE ENCOUNTER
"Pt calls together with [facility]  ([facility]).  Has post-op questions:  - CHOLECYSTECTOMY and EXCISION OF MASS FROM  SECTION SCAR with David Lilly MD   Surgery occurred 24.    Umbilicus puncture wound has \"some dried blood.\"  Reviewed After Visit Summary from Surgery date (24):  Return to normal activity as tolerated. If an activity hurts, don't do it. If it doesn't hurt, proceed cautiously for  the first two weeks.  Shower/Bathing - Restrictions: You may shower 24 hours after your operation. Let water run over incisions and  pat dry. No tub baths until bleeding stops. Do NOT soak in any body of water (lake, pool, bath, etc.) for 7-10  days postoperatively.  Discharge Instruction - Comfort and Pain Management  You may take Tylenol and Ibuprofen in their recommended over the counter strength and frequency for mild  to moderate pain.    Reports taking Tylenol for pain.  Was prescribed oxycodone..  \"Today feeling a little better.\"    \"Hurts with coughing.\"  Discussed keeping a small pillow handy at all times in case of coughs or sneezes, to use as a brace.  Also use pillow to brace abdomen while sitting on toilet for bowel movements.  Was prescribed docusate for constipation which pt has not yet picked up from pharmacy. (!)  Advised having her sister  Rx for her.  Explained importance of avoiding straining while abdominal surgical wounds are healing.   Also reviewed dietary choices to prevent constipation.    Has follow-up surgical appt 24.  Confirmed appt time w/pt who verbalizes understanding.  Intends to keep.  Understands she may ask all her specific questions at that time in re to scar removal from past .  Call back sooner if any onset of infection symptoms (reviewed).    Yi Camacho RN  [facility] Nurse Advisor     Reason for Disposition   Skin tape (e.g., Steri-Strips) removal, questions about     Pt has chosen to remove steri-strips, now on day five " post-op.    Additional Information   Negative: [1] Major abdominal surgical incision AND [2] wound gaping open AND [3] visible internal organs   Negative: Sounds like a life-threatening emergency to the triager   Negative: Patient has a Negative Pressure Wound Therapy device   Negative: Patient is followed by a wound clinic or wound specialist for this wound   Negative: [1] Bleeding from incision AND [2] won't stop after 10 minutes of direct pressure   Negative: [1] Bleeding (more than a few drops) from incision AND [2] tracheostomy or blood vessel surgery (e.g., carotid endarterectomy, femoral bypass graft, kidney dialysis fistula)   Negative: [1] Widespread rash AND [2] bright red, sunburn-like   Negative: Severe pain in the incision   Negative: [1] Incision gaping open AND [2] < 48 hours since wound re-opened   Negative: [1] Incision gaping open AND [2] length of opening > 2 inches (5 cm)   Negative: Patient sounds very sick or weak to the triager   Negative: Sounds like a serious complication to the triager   Negative: Fever > 100.4 F (38.0 C)   Negative: [1] Incision looks infected (spreading redness, pain) AND [2] fever > 99.5 F (37.5 C)   Negative: [1] Incision looks infected (spreading redness, pain) AND [2] large red area (> 2 in. or 5 cm)   Negative: [1] Incision looks infected (spreading redness, pain) AND [2] face wound   Negative: [1] Red streak runs from the incision AND [2] longer than 1 inch (2.5 cm)   Negative: [1] Pus or bad-smelling fluid draining from incision AND [2] no fever   Negative: [1] Post-op pain AND [2] not controlled with pain medications   Negative: Dressing soaked with blood or body fluid (e.g., drainage)   Negative: [1] Scant bleeding (e.g., few drops) from incision AND AND [2] tracheostomy or blood vessel surgery (e.g., carotid endarterectomy, femoral bypass graft, kidney dialysis fistula)   Negative: [1] Raised bruise and [2] size > 2 inches (5 cm) and expanding   Negative: [1]  Caller has URGENT question AND [2] triager unable to answer question   Negative: [1] INCREASING pain in incision AND [2] > 2 days (48 hours) since surgery   Negative: [1] Small red area or streak AND [2] no fever   Negative: [1] Clear or blood-tinged fluid draining from wound AND [2] no fever   Negative: [1] Incision gaping open AND [2] > 48 hours since wound re-opened AND [3] length of opening > 1/2 inch (12 mm)   Negative: [1] Incision on face gaping open AND [2] > 48 hours since wound re-opened AND [3] length of opening > 1/4 inch (6 mm)   Negative: Suture or staple removal is overdue   Negative: [1] Suture or staple came out early AND [2] caller wants wound checked   Negative: [1] Caller has NON-URGENT question AND [2] triager unable to answer question   Negative: Pimple where a stitch comes through the skin   Negative: Suture (or staple) came out early   Negative: Mild bruising near incision site   Negative: Suture removal date, questions about    Protocols used: Post-Op Incision Symptoms and [id number]

## 2024-07-05 ENCOUNTER — OFFICE VISIT (OUTPATIENT)
Dept: FAMILY MEDICINE | Facility: CLINIC | Age: 29
End: 2024-07-05
Payer: COMMERCIAL

## 2024-07-05 VITALS
OXYGEN SATURATION: 99 % | HEART RATE: 77 BPM | DIASTOLIC BLOOD PRESSURE: 64 MMHG | RESPIRATION RATE: 16 BRPM | SYSTOLIC BLOOD PRESSURE: 101 MMHG | TEMPERATURE: 98.6 F | BODY MASS INDEX: 20.57 KG/M2 | HEIGHT: 58 IN | WEIGHT: 98 LBS

## 2024-07-05 DIAGNOSIS — R09.82 POSTNASAL DRIP: ICD-10-CM

## 2024-07-05 DIAGNOSIS — Z09 POSTOPERATIVE FOLLOW-UP: Primary | ICD-10-CM

## 2024-07-05 DIAGNOSIS — N30.00 ACUTE CYSTITIS WITHOUT HEMATURIA: ICD-10-CM

## 2024-07-05 DIAGNOSIS — N89.8 VAGINAL DISCHARGE: ICD-10-CM

## 2024-07-05 DIAGNOSIS — B37.31 CANDIDA VAGINITIS: ICD-10-CM

## 2024-07-05 LAB
ALBUMIN UR-MCNC: NEGATIVE MG/DL
APPEARANCE UR: CLEAR
BACTERIA #/AREA URNS HPF: ABNORMAL /HPF
BILIRUB UR QL STRIP: NEGATIVE
CLUE CELLS: ABNORMAL
COLOR UR AUTO: YELLOW
GLUCOSE UR STRIP-MCNC: NEGATIVE MG/DL
HGB UR QL STRIP: NEGATIVE
KETONES UR STRIP-MCNC: NEGATIVE MG/DL
LEUKOCYTE ESTERASE UR QL STRIP: ABNORMAL
NITRATE UR QL: NEGATIVE
PH UR STRIP: 6 [PH] (ref 5–7)
RBC #/AREA URNS AUTO: ABNORMAL /HPF
SP GR UR STRIP: 1.01 (ref 1–1.03)
SQUAMOUS #/AREA URNS AUTO: ABNORMAL /LPF
TRICHOMONAS, WET PREP: ABNORMAL
UROBILINOGEN UR STRIP-ACNC: 1 E.U./DL
WBC #/AREA URNS AUTO: ABNORMAL /HPF
WBC'S/HIGH POWER FIELD, WET PREP: ABNORMAL
YEAST, WET PREP: ABNORMAL

## 2024-07-05 PROCEDURE — 87086 URINE CULTURE/COLONY COUNT: CPT

## 2024-07-05 PROCEDURE — 87210 SMEAR WET MOUNT SALINE/INK: CPT

## 2024-07-05 PROCEDURE — 99213 OFFICE O/P EST LOW 20 MIN: CPT | Mod: GC

## 2024-07-05 PROCEDURE — 87491 CHLMYD TRACH DNA AMP PROBE: CPT

## 2024-07-05 PROCEDURE — 87591 N.GONORRHOEAE DNA AMP PROB: CPT

## 2024-07-05 PROCEDURE — 81001 URINALYSIS AUTO W/SCOPE: CPT

## 2024-07-05 RX ORDER — NITROFURANTOIN 25; 75 MG/1; MG/1
100 CAPSULE ORAL 2 TIMES DAILY
Qty: 10 CAPSULE | Refills: 0 | Status: SHIPPED | OUTPATIENT
Start: 2024-07-05 | End: 2024-07-10

## 2024-07-05 RX ORDER — FLUCONAZOLE 150 MG/1
150 TABLET ORAL ONCE
Qty: 1 TABLET | Refills: 0 | Status: SHIPPED | OUTPATIENT
Start: 2024-07-05 | End: 2024-07-05

## 2024-07-05 RX ORDER — FLUTICASONE PROPIONATE 50 MCG
1 SPRAY, SUSPENSION (ML) NASAL DAILY
Qty: 18.2 ML | Refills: 1 | Status: SHIPPED | OUTPATIENT
Start: 2024-07-05

## 2024-07-05 NOTE — PROGRESS NOTES
Assessment & Plan     Postoperative follow-up  Elective cholecystectomy and excision of mass from  abdominal wall mass completed on  by Dr. Lilly. Pain well-controlled. No issues w/BM, passing pritesh. Incision sites - nonerythematous, nonedematous, w/o exudates.   - Will reach out to Dr. Lilly to see if patient needs post-op follow-up with him    Postnasal drip  - fluticasone (FLONASE) 50 MCG/ACT nasal spray; Spray 1 spray into both nostrils daily    Vaginal discharge  Reports increased white discharge wetting through underwear. Otherwise asymptomatic. Could be physiological though it was noted on speculum exam that there was copious amounts of yellow discharge from cervical os. Cervix not friable, but did appear erythematous and easily irritated - raises concern for infection.  - Chlamydia trachomatis/Neisseria gonorrhoeae by PCR (cervix)  - Wet preparation (vagina)  - UA Macroscopic with reflex to Microscopic and Culture; Future  - UA Macroscopic with reflex to Microscopic and Culture      Subjective   Lae is a 28 year old, presenting for the following health issues:  RECHECK (Follow up surgery)        2024     2:05 PM   Additional Questions   Roomed by charles     HPI     POD#12 from elective cholecystectomy w/excision of mass from  scar.   Surgeon: Dr. Lilly  Pain? Well-controlled w/tylenol   N/V? No   Passing gas? Yes  BM? Yes     Post-nasal drip  Mild improvement w/H2 blocker   Still having some mucus in throat when waking up in morning.     Vaginal discharge  Ongoing for 1y  Copious amounts that wet her underwear  Denies odor, dysuria, hematuria, increased urine frequency. Seen by Dr. Mattson  for this - wet prep neg at this visit.  Is sexually active w/her  but has been a while since they slept together. She is not concerned for STI's but would still like to check.   No HA/vision changes, fevers/chills, dysuria, increased urine frequency, abdominal pain, abnormal bleeding.  "        Review of Systems  Constitutional, HEENT, cardiovascular, pulmonary, gi and gu systems are negative, except as otherwise noted.      Objective    /64   Pulse 77   Temp 98.6  F (37  C) (Oral)   Resp 16   Ht 1.48 m (4' 10.27\")   Wt 44.5 kg (98 lb)   LMP 05/16/2024 (Approximate)   SpO2 99%   BMI 20.29 kg/m    Body mass index is 20.29 kg/m .  Physical Exam     GENERAL: Active, alert, in no acute distress.  LUNGS: Normal WOB, no respiratory distress  HEART: Appears well-perfused.  MSK: ROM of all 4 extremities grossly intact, no BLE edema on gross examination   AB: Incision sites well-healed - no erythema, exudates, or edema.    : Incision dressing CDI. Cervix: pink, round, friable, copious sticky yellow-colored discharge, mild erythema  SKIN: No obvious lesions on gross examination  NEUROLOGIC: Normal tone throughout. Normal reflexes for age  PSYCH: Mood appropriate, normal affect            Signed Electronically by: Loco Montoya MD    "

## 2024-07-05 NOTE — PROGRESS NOTES
Preceptor Attestation:  Patient's case reviewed and discussed with the resident, Loco Montoya MD, and I personally evaluated the patient. I agree with written assessment and plan of care.    Supervising Physician:  Janis Verma MD   [facility]

## 2024-07-05 NOTE — LETTER
July 9, 2024      Belkis Sanchez  [address] [place] [address]        Dear ,    We are writing to inform you of your test results.    Your test results fall within the expected range(s) or remain unchanged from previous results.  Please continue with current treatment plan.    Resulted Orders   Chlamydia trachomatis/Neisseria gonorrhoeae by PCR (cervix)   Result Value Ref Range    Chlamydia Trachomatis Negative Negative      Comment:      Negative for C. trachomatis rRNA by transcription mediated amplification.   A negative result by transcription mediated amplification does not preclude the presence of infection because results are dependent on proper and adequate collection, absence of inhibitors and sufficient rRNA to be detected.    Neisseria gonorrhoeae Negative Negative      Comment:      Negative for N. gonorrhoeae rRNA by transcription mediated amplification. A negative result by transcription mediated amplification does not preclude the presence of C. trachomatis infection because results are dependent on proper and adequate collection, absence of inhibitors and sufficient rRNA to be detected.   Wet preparation (vagina)   Result Value Ref Range    Trichomonas Absent Absent    Yeast Absent Absent    Clue Cells Absent Absent    WBCs/high power field 2+ (A) None    Narrative    No ph  No odor  Many bacteria   UA Macroscopic with reflex to Microscopic and Culture   Result Value Ref Range    Color Urine Yellow Colorless, Straw, Light Yellow, Yellow    Appearance Urine Clear Clear    Glucose Urine Negative Negative mg/dL    Bilirubin Urine Negative Negative    Ketones Urine Negative Negative mg/dL    Specific Gravity Urine 1.015 1.003 - 1.035    Blood Urine Negative Negative    pH Urine 6.0 5.0 - 7.0    Protein Albumin Urine Negative Negative mg/dL    Urobilinogen Urine 1.0 0.2, 1.0 E.U./dL    Nitrite Urine Negative Negative    Leukocyte Esterase Urine Moderate (A) Negative   Urine Microscopic Exam   Result  Value Ref Range    Bacteria Urine Few (A) None Seen /HPF    RBC Urine None Seen 0-2 /HPF /HPF    WBC Urine 0-5 0-5 /HPF /HPF    Squamous Epithelials Urine Few (A) None Seen /LPF   Urine Culture   Result Value Ref Range    Culture No Growth        If you have any questions or concerns, please call the clinic at the number listed above.       Sincerely,      Rebecca Colvin MD

## 2024-07-06 LAB
C TRACH DNA SPEC QL PROBE+SIG AMP: NEGATIVE
N GONORRHOEA DNA SPEC QL NAA+PROBE: NEGATIVE

## 2024-07-07 LAB — BACTERIA UR CULT: NO GROWTH

## 2024-07-26 ENCOUNTER — OFFICE VISIT (OUTPATIENT)
Dept: FAMILY MEDICINE | Facility: CLINIC | Age: 29
End: 2024-07-26
Payer: COMMERCIAL

## 2024-07-26 VITALS
OXYGEN SATURATION: 99 % | WEIGHT: 91.2 LBS | TEMPERATURE: 97.5 F | SYSTOLIC BLOOD PRESSURE: 116 MMHG | HEIGHT: 59 IN | BODY MASS INDEX: 18.39 KG/M2 | HEART RATE: 83 BPM | RESPIRATION RATE: 16 BRPM | DIASTOLIC BLOOD PRESSURE: 77 MMHG

## 2024-07-26 DIAGNOSIS — F99 INSOMNIA DUE TO OTHER MENTAL DISORDER: ICD-10-CM

## 2024-07-26 DIAGNOSIS — F33.3 SEVERE RECURRENT MAJOR DEPRESSIVE DISORDER WITH PSYCHOTIC FEATURES (H): ICD-10-CM

## 2024-07-26 DIAGNOSIS — R45.850 HOMICIDAL IDEATION: ICD-10-CM

## 2024-07-26 DIAGNOSIS — F51.05 INSOMNIA DUE TO OTHER MENTAL DISORDER: ICD-10-CM

## 2024-07-26 DIAGNOSIS — R45.851 SUICIDAL IDEATION: Primary | ICD-10-CM

## 2024-07-26 PROCEDURE — 99215 OFFICE O/P EST HI 40 MIN: CPT | Mod: GC

## 2024-07-26 PROCEDURE — G2211 COMPLEX E/M VISIT ADD ON: HCPCS

## 2024-07-26 ASSESSMENT — PATIENT HEALTH QUESTIONNAIRE - PHQ9
SUM OF ALL RESPONSES TO PHQ QUESTIONS 1-9: 15
10. IF YOU CHECKED OFF ANY PROBLEMS, HOW DIFFICULT HAVE THESE PROBLEMS MADE IT FOR YOU TO DO YOUR WORK, TAKE CARE OF THINGS AT HOME, OR GET ALONG WITH OTHER PEOPLE: SOMEWHAT DIFFICULT
SUM OF ALL RESPONSES TO PHQ QUESTIONS 1-9: 15

## 2024-07-26 NOTE — PROGRESS NOTES
"I was present with the medical student who participated in the service and in the documentation of this note. I have verified the history and personally performed the physical exam and medical decision making, and have verified the content of the note, which accurately reflects my assessment of the patient and the plan of care.     Patient requiring higher level of care based on ongoing comments about self harm and harm to her child. 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 Unwilling to pursue outpatient therapies. Hold placed and patient sent to [facility] via EMS. Called and discussed with charge nurse; patient will be admitted.     Steph Pleitez MD PGY-3  [facility] Residency      Assessment & Plan   Belkis Sanchez is a 28-year-old female with history of psychosis, auditory hallucinations, and visual hallucinations who presents to clinic for mental health referral.    Auditory Hallucinations / Possible Visual Hallucinations   Suicidal Ideation   Homicidal Ideation   Depressed Mood   Sleep Disturbance   - Please see HPI for full history of psychosis. Auditory hallucinations started prior to birth of daughter in May of 2021, but worsened in postpartum period. Admitted April 2022 for acute psychosis with SI and HI, having been found wrapping cord around daughter's neck. Symptoms improved with Abilify injections. Did not routinely take Seroquel due to increased drowsiness.   - This past week, auditory hallucinations worsened. Now unable to sleep at night. Possible visual hallucinations described as dreams where she sees a \"large, evil being.\" Symptoms have caused her to quit her job and move in with mom and sister. She endorses SI and HI without plan. Hallucinations tell her to kill herself and her baby.  - History and exam most consistent with MDD with psychosis. Schizophrenia also possible. Patient has flat affect and is withdrawn on " "exam. Behavior is slowed. Responds to question slowly and seems to have trouble understanding what is being asked. Impaired cognition possible.    - Patient has a safety plan but has no treatment plan. Either mom and sister is at home to monitor at all times. However, she is not amenable to starting any medications at this time. She states she wants to \"be alone in a quiet place to fix her mind.\" Sister reports patient has refused to take medications in past. Sister has had a very difficult time giving the patient her previously prescribed mediations.   Plan:   - I am concerned that the patient does not have capacity to make safe decisions. Hold placed. Patient transferred to inpatient services via EMS.       Depression Screening Follow Up        7/26/2024     8:33 AM   PHQ   PHQ-9 Total Score 15   Q9: Thoughts of better off dead/self-harm past 2 weeks Several days   F/U: Thoughts of suicide or self-harm No   F/U: Safety concerns No          No data to display              Follow Up Actions Taken  ED visit recommended.  Transportation hold completed.    Discussed the following ways the patient can remain in a safe environment:  at this time, patient requiring emergency room level of care. Not willing to re-initiate medications at home. Endorses thoughts of self harm and harm to her child.     Alexis Tamayo is a 28 year old, presenting for the following health issues:  RECHECK (Follow up uti - symptoms/depression)      7/26/2024     8:29 AM   Additional Questions   Roomed by charles         7/26/2024    Information    services provided? Yes   Language Hmong   Type of interpretation provided Face-to-face    name Jared    [employer]        HPI   \\\\    Belkis Sanchez is a 28-year-old female with history of psychosis, auditory hallucinations, and visual hallucinations who presents to clinic for mental health referral. She is accompanied by sister who provides additional " "history.     Exact start of auditory hallucinations is unclear. The patient reports she began to experienced auditory hallucinations after giving birth to her daughter in May of 2021. In April of 2022 she was admitted for acute psychosis after she had wrapped a cord around her daughter's neck. Per admission summary, auditory hallucinations predated birth and worsened during post partum period.     Following admission, she was subsequently started on Seroquel and Abilify. Also started a program at [facility] where Abilify injections were administered. Although both medications helped alleviate her auditory hallucinations, she did not routinely take her Seroquel as it made her drowsy.     Here, the patient reports that her auditory hallucinations abruptly worsened over the past week. Auditory hallucinations primarily occur at night and are very distressing to the patient. Hallucinations have been preventing her from sleeping and she has been forced to quit her job recently because she can no longer focus during the day.     She endorses thoughts of harming herself and her child, stating, \"the voices tell me it would be better to die and take the baby with me.\" She also endorses \"dreams\" at night where she sees a \"large, evil being\" that tells her to kill herself and her baby. She does not report a specific plan for killing herself or her child. She has recently moved in to live with her Mom and Sister. Someone is in the house at all times when the patient is there.     She states that, \"A lot is going on in my head\" and that she \"wants to be alone to clear her mind.\" She has talked about her symptoms with her [employer] leaders and they have recommended she see a doctor.     She also endorses associated depression and decreased appetite.     ROS otherwise negative apart from symptoms previously noted in HPI and A&P.       Objective    /77   Pulse 83   Temp 97.5  F (36.4  C) (Oral)   Resp 16   Ht 1.5 m (4' 11.06\")   " Wt 41.4 kg (91 lb 3.2 oz)   LMP 05/16/2024 (Approximate)   SpO2 99%   BMI 18.39 kg/m    Body mass index is 18.39 kg/m .  Physical Exam  Vitals reviewed.   Constitutional:       General: She is not in acute distress.     Appearance: Normal appearance. She is normal weight. She is not ill-appearing, toxic-appearing or diaphoretic.   HENT:      Head: Normocephalic and atraumatic.      Nose: Nose normal. No rhinorrhea.   Eyes:      General: No scleral icterus.     Conjunctiva/sclera: Conjunctivae normal.   Cardiovascular:      Rate and Rhythm: Normal rate.   Pulmonary:      Effort: Pulmonary effort is normal.   Abdominal:      General: There is no distension.   Neurological:      General: No focal deficit present.      Mental Status: She is alert.   Psychiatric:         Attention and Perception: Attention normal.         Mood and Affect: Affect is flat.         Speech: Speech normal. She is communicative. Speech is not rapid and pressured or tangential.         Behavior: Behavior is slowed and withdrawn. Behavior is not agitated or aggressive. Behavior is cooperative.         Thought Content: Thought content is not paranoid or delusional. Thought content includes homicidal and suicidal ideation. Thought content does not include homicidal or suicidal plan.      Comments: Very slow to answer questions, there may be some impaired cognition causing leading to this. Flat affect.          Asim Bird MS3   [employer]     Signed Electronically by: Steph Pleitez MD    Answers submitted by the patient for this visit:  Patient Health Questionnaire (Submitted on 7/26/2024)  If you checked off any problems, how difficult have these problems made it for you to do your work, take care of things at home, or get along with other people?: Somewhat difficult  PHQ9 TOTAL SCORE: 15

## 2024-07-26 NOTE — LETTER
"7/26/2024      Belkis Sanchez  [address] [place] [address]      Dear Colleague,    Thank you for referring your patient, Belkis Sanchez, to the [facility]. Please see a copy of my visit note below.    I was present with the medical student who participated in the service and in the documentation of this note. I have verified the history and personally performed the physical exam and medical decision making, and have verified the content of the note, which accurately reflects my assessment of the patient and the plan of care.     Patient requiring higher level of care based on ongoing comments about self harm and harm to her child. 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 Unwilling to pursue outpatient therapies. Hold placed and patient sent to [facility] via EMS. Called and discussed with charge nurse; patient will be admitted.     Steph Pleitez MD PGY-3  [facility] Residency      Assessment & Plan  Belkis Sanchez is a 28-year-old female with history of psychosis, auditory hallucinations, and visual hallucinations who presents to clinic for mental health referral.    Auditory Hallucinations / Possible Visual Hallucinations   Suicidal Ideation   Homicidal Ideation   Depressed Mood   Sleep Disturbance   - Please see HPI for full history of psychosis. Auditory hallucinations started prior to birth of daughter in May of 2021, but worsened in postpartum period. Admitted April 2022 for acute psychosis with SI and HI, having been found wrapping cord around daughter's neck. Symptoms improved with Abilify injections. Did not routinely take Seroquel due to increased drowsiness.   - This past week, auditory hallucinations worsened. Now unable to sleep at night. Possible visual hallucinations described as dreams where she sees a \"large, evil being.\" Symptoms have caused her to quit her job and move in with mom and sister. She " "endorses SI and HI without plan. Hallucinations tell her to kill herself and her baby.  - History and exam most consistent with MDD with psychosis. Schizophrenia also possible. Patient has flat affect and is withdrawn on exam. Behavior is slowed. Responds to question slowly and seems to have trouble understanding what is being asked. Impaired cognition possible.    - Patient has a safety plan but has no treatment plan. Either mom and sister is at home to monitor at all times. However, she is not amenable to starting any medications at this time. She states she wants to \"be alone in a quiet place to fix her mind.\" Sister reports patient has refused to take medications in past. Sister has had a very difficult time giving the patient her previously prescribed mediations.   Plan:   - I am concerned that the patient does not have capacity to make safe decisions. Hold placed. Patient transferred to inpatient services via EMS.       Depression Screening Follow Up        7/26/2024     8:33 AM   PHQ   PHQ-9 Total Score 15   Q9: Thoughts of better off dead/self-harm past 2 weeks Several days   F/U: Thoughts of suicide or self-harm No   F/U: Safety concerns No          No data to display              Follow Up Actions Taken  ED visit recommended.  Transportation hold completed.    Discussed the following ways the patient can remain in a safe environment:  at this time, patient requiring emergency room level of care. Not willing to re-initiate medications at home. Endorses thoughts of self harm and harm to her child.     Subjective   Belkis is a 28 year old, presenting for the following health issues:  RECHECK (Follow up uti - symptoms/depression)      7/26/2024     8:29 AM   Additional Questions   Roomed by charles         7/26/2024    Information    services provided? Yes   Language Hmong   Type of interpretation provided Face-to-face    name [facility]    [employer] " "  \\\\    Belkis Sanchez is a 28-year-old female with history of psychosis, auditory hallucinations, and visual hallucinations who presents to clinic for mental health referral. She is accompanied by sister who provides additional history.     Exact start of auditory hallucinations is unclear. The patient reports she began to experienced auditory hallucinations after giving birth to her daughter in May of 2021. In April of 2022 she was admitted for acute psychosis after she had wrapped a cord around her daughter's neck. Per admission summary, auditory hallucinations predated birth and worsened during post partum period.     Following admission, she was subsequently started on Seroquel and Abilify. Also started a program at [facility] where Abilify injections were administered. Although both medications helped alleviate her auditory hallucinations, she did not routinely take her Seroquel as it made her drowsy.     Here, the patient reports that her auditory hallucinations abruptly worsened over the past week. Auditory hallucinations primarily occur at night and are very distressing to the patient. Hallucinations have been preventing her from sleeping and she has been forced to quit her job recently because she can no longer focus during the day.     She endorses thoughts of harming herself and her child, stating, \"the voices tell me it would be better to die and take the baby with me.\" She also endorses \"dreams\" at night where she sees a \"large, evil being\" that tells her to kill herself and her baby. She does not report a specific plan for killing herself or her child. She has recently moved in to live with her Mom and Sister. Someone is in the house at all times when the patient is there.     She states that, \"A lot is going on in my head\" and that she \"wants to be alone to clear her mind.\" She has talked about her symptoms with her [employer] leaders and they have recommended she see a doctor.     She also endorses associated " "depression and decreased appetite.     ROS otherwise negative apart from symptoms previously noted in HPI and A&P.       Objective    /77   Pulse 83   Temp 97.5  F (36.4  C) (Oral)   Resp 16   Ht 1.5 m (4' 11.06\")   Wt 41.4 kg (91 lb 3.2 oz)   LMP 05/16/2024 (Approximate)   SpO2 99%   BMI 18.39 kg/m    Body mass index is 18.39 kg/m .  Physical Exam  Vitals reviewed.   Constitutional:       General: She is not in acute distress.     Appearance: Normal appearance. She is normal weight. She is not ill-appearing, toxic-appearing or diaphoretic.   HENT:      Head: Normocephalic and atraumatic.      Nose: Nose normal. No rhinorrhea.   Eyes:      General: No scleral icterus.     Conjunctiva/sclera: Conjunctivae normal.   Cardiovascular:      Rate and Rhythm: Normal rate.   Pulmonary:      Effort: Pulmonary effort is normal.   Abdominal:      General: There is no distension.   Neurological:      General: No focal deficit present.      Mental Status: She is alert.   Psychiatric:         Attention and Perception: Attention normal.         Mood and Affect: Affect is flat.         Speech: Speech normal. She is communicative. Speech is not rapid and pressured or tangential.         Behavior: Behavior is slowed and withdrawn. Behavior is not agitated or aggressive. Behavior is cooperative.         Thought Content: Thought content is not paranoid or delusional. Thought content includes homicidal and suicidal ideation. Thought content does not include homicidal or suicidal plan.      Comments: Very slow to answer questions, there may be some impaired cognition causing leading to this. Flat affect.          Asim Bird MS3   [employer]     Signed Electronically by: Steph Pleitez MD    Answers submitted by the patient for this visit:  Patient Health Questionnaire (Submitted on 7/26/2024)  If you checked off any problems, how difficult have these problems made it for you to do your work, take " care of things at home, or get along with other people?: Somewhat difficult  PHQ9 TOTAL SCORE: 15      Preceptor Attestation:   Patient seen, evaluated and discussed with the resident. I have verified the content of the note, which accurately reflects my assessment of the patient and the plan of care.    I am very concerned for this patient's safety. She is not clear on her willingness to engage with outpatient treatment plans as she was not interested in taking medicines to improve her mood. I reviewed this with the patient and her sister that was present in the room. Patient sent to ED for further evaluation.    The longitudinal plan of care for the diagnosis(es)/condition(s) as documented were addressed during this visit. Due to the added complexity in care, I will continue to support Lae in the subsequent management and with ongoing continuity of care.    Supervising Physician:Bakari Borjas MD    [facility]

## 2024-08-02 NOTE — PROGRESS NOTES
Preceptor Attestation:   Patient seen, evaluated and discussed with the resident. I have verified the content of the note, which accurately reflects my assessment of the patient and the plan of care.    I am very concerned for this patient's safety. She is not clear on her willingness to engage with outpatient treatment plans as she was not interested in taking medicines to improve her mood. I reviewed this with the patient and her sister that was present in the room. Patient sent to ED for further evaluation.    The longitudinal plan of care for the diagnosis(es)/condition(s) as documented were addressed during this visit. Due to the added complexity in care, I will continue to support Lae in the subsequent management and with ongoing continuity of care.    Supervising Physician:Bakari Borjas MD    [facility]

## 2024-12-26 ENCOUNTER — TELEPHONE (OUTPATIENT)
Dept: FAMILY MEDICINE | Facility: CLINIC | Age: 29
End: 2024-12-26
Payer: COMMERCIAL

## 2024-12-26 NOTE — TELEPHONE ENCOUNTER
Patient Returning Call    Reason for call:  Sister of patient, Aimee, stopped by in clinic seeking for advice. Sister states patient is refusing to eat and she stopped taking her medications for schizophrenia for about 6 months now. Please call and advise as patient is also refusing to come into clinic.     Information relayed to patient:  Patient rep offered virtual video appointment, sister declined and stated that patient will not cooperate and speak to doctor. Informed sister that message be sent to PCP to review.         Who does the patient want to speak with:  Provider or possibly RN     Is an  needed?:  Yes: Jez       Okay to leave a detailed message?: Yes at Work number on file:  [phone] (work) sisterAimee's phone

## 2024-12-26 NOTE — TELEPHONE ENCOUNTER
Writer called patient's sister back to discuss. Patient is refusing food, unable to care for herself at this time due to worsening mental illness. Symptoms started after discharge from the hospital last July. Patient refuses to come to clinic, refusing current medications. Writer advised due to significance of mental illness and past suicidal and homicidal ideation, writer recommends calling 911 for paramedics to arrive and assess. Writer advised there are psychiatric holds that can be placed if needed based on the assessment, and they can take her to the hospital if they deem necessary. The sister verbalized understanding and is agreeable to the plan. Neymar ADAMS

## 2025-02-14 ENCOUNTER — APPOINTMENT (OUTPATIENT)
Dept: INTERPRETER SERVICES | Facility: CLINIC | Age: 30
End: 2025-02-14
Payer: COMMERCIAL

## 2025-02-26 ENCOUNTER — OFFICE VISIT (OUTPATIENT)
Dept: FAMILY MEDICINE | Facility: CLINIC | Age: 30
End: 2025-02-26
Payer: COMMERCIAL

## 2025-02-26 VITALS
SYSTOLIC BLOOD PRESSURE: 112 MMHG | BODY MASS INDEX: 19.56 KG/M2 | OXYGEN SATURATION: 100 % | HEIGHT: 59 IN | TEMPERATURE: 98 F | HEART RATE: 72 BPM | DIASTOLIC BLOOD PRESSURE: 73 MMHG | RESPIRATION RATE: 20 BRPM | WEIGHT: 97 LBS

## 2025-02-26 DIAGNOSIS — F29 PSYCHOSIS, UNSPECIFIED PSYCHOSIS TYPE (H): ICD-10-CM

## 2025-02-26 DIAGNOSIS — F20.3 UNDIFFERENTIATED SCHIZOPHRENIA (H): ICD-10-CM

## 2025-02-26 DIAGNOSIS — N89.8 VAGINAL DISCHARGE: Primary | ICD-10-CM

## 2025-02-26 LAB
ALBUMIN UR-MCNC: NEGATIVE MG/DL
APPEARANCE UR: CLEAR
BILIRUB UR QL STRIP: NEGATIVE
CLUE CELLS: ABNORMAL
COLOR UR AUTO: YELLOW
GLUCOSE UR STRIP-MCNC: NEGATIVE MG/DL
HGB UR QL STRIP: NEGATIVE
KETONES UR STRIP-MCNC: NEGATIVE MG/DL
LEUKOCYTE ESTERASE UR QL STRIP: NEGATIVE
NITRATE UR QL: NEGATIVE
PH UR STRIP: 6 [PH] (ref 5–7)
SP GR UR STRIP: 1.01 (ref 1–1.03)
TRICHOMONAS, WET PREP: ABNORMAL
UROBILINOGEN UR STRIP-ACNC: 0.2 E.U./DL
WBC'S/HIGH POWER FIELD, WET PREP: ABNORMAL
YEAST, WET PREP: ABNORMAL

## 2025-02-26 PROCEDURE — 3078F DIAST BP <80 MM HG: CPT

## 2025-02-26 PROCEDURE — 87591 N.GONORRHOEAE DNA AMP PROB: CPT

## 2025-02-26 PROCEDURE — 99214 OFFICE O/P EST MOD 30 MIN: CPT | Mod: GC

## 2025-02-26 PROCEDURE — 87210 SMEAR WET MOUNT SALINE/INK: CPT

## 2025-02-26 PROCEDURE — 3074F SYST BP LT 130 MM HG: CPT

## 2025-02-26 PROCEDURE — 81003 URINALYSIS AUTO W/O SCOPE: CPT

## 2025-02-26 PROCEDURE — 87491 CHLMYD TRACH DNA AMP PROBE: CPT

## 2025-02-26 RX ORDER — ARIPIPRAZOLE 15 MG/1
15 TABLET ORAL DAILY
COMMUNITY
Start: 2024-08-30

## 2025-02-26 ASSESSMENT — PATIENT HEALTH QUESTIONNAIRE - PHQ9
SUM OF ALL RESPONSES TO PHQ QUESTIONS 1-9: 0
SUM OF ALL RESPONSES TO PHQ QUESTIONS 1-9: 0

## 2025-02-26 NOTE — PROGRESS NOTES
Assessment & Plan     Vaginal discharge  Presents w/8mo of non-odorous green vaginal discharge that worsens prior to her menstrual periods. No other associated sx. Suspect this is likely normal physiological variation, especially with it getting heavier prior to her periods. R/o infection. Not on any offending medications.   - Wet preparation  - Chlamydia trachomatis/Neisseria gonorrhoeae by PCR - Clinic Collect  - UA Macroscopic with reflex to Microscopic and Culture; Future  - UA Macroscopic with reflex to Microscopic and Culture  - Education provided about how this is likely normal physiological variation. If patient is worried, could pursue pelvic US.     Psychosis  Undifferentiated schizophrenia  Hospitalized at [facility] 1/6 - 1/9 for acute psychosis, concerned for schizophrenia. AH - patient had self-discontinued abilify. Now on GARCIA - she reports she will go to a clinic in [place] for this, but unsure of clinic name. Wondering if she could transition to PO medications.   -Advised patient to resume GARCIA for now and follow-up w/psychiatrist   -Patient advised to bring in psych clinic information for [facility] so we will also have records and be able to assist her in her cares as appropriate.        Subjective   Lae is a 29 year old, presenting for the following health issues:  Vaginal Problem (Green discharge, no itch )        2/26/2025     9:28 AM   Additional Questions   Roomed by Mervat godwin         2/26/2025    Information    services provided? Yes   Language Hmong   Type of interpretation provided Face-to-face    name Ciara pritchard her    [employer]    phone number [phone]     HPI   PMHx: Schizophrenia (Abilify GARCIA)    Vaginal discharge  Onset: Jul 2024 when finished abx   Small amounts, gets worse than before. Wet underwears right before periods.   Odor: No  Dysuria: No  Hematuria: No   Urinary frequency: No  Incontinence: No    LMP: 1/28/25,  "  Last sexually active: 4mo ago. .     Clinic in [facility] for GARCIA.     Review of Systems  Constitutional, HEENT, cardiovascular, pulmonary, gi and gu systems are negative, except as otherwise noted.      Objective    /73   Pulse 72   Temp 98  F (36.7  C)   Resp 20   Ht 1.499 m (4' 11\")   Wt 44 kg (97 lb)   LMP 01/28/2025 (Exact Date)   SpO2 100%   BMI 19.59 kg/m    Body mass index is 19.59 kg/m .  Physical Exam   GENERAL: Active, alert, in no acute distress.  LUNGS: Normal WOB, no respiratory distress  HEART: Appears well-perfused.  MSK: ROM of all 4 extremities grossly intact, no BLE edema on gross examination   SKIN: No obvious lesions on gross examination  : Declined - patient preference  NEUROLOGIC: Normal tone throughout. Normal reflexes for age  PSYCH: Flat affect, slow speech. Linear and logical thought process. Minimal AH.               Signed Electronically by: Loco Montoya MD    "

## 2025-02-27 LAB
C TRACH DNA SPEC QL PROBE+SIG AMP: NEGATIVE
N GONORRHOEA DNA SPEC QL NAA+PROBE: NEGATIVE
SPECIMEN TYPE: NORMAL

## 2025-04-03 ENCOUNTER — OFFICE VISIT (OUTPATIENT)
Dept: FAMILY MEDICINE | Facility: CLINIC | Age: 30
End: 2025-04-03
Payer: COMMERCIAL

## 2025-04-03 VITALS
DIASTOLIC BLOOD PRESSURE: 71 MMHG | HEART RATE: 70 BPM | WEIGHT: 104 LBS | HEIGHT: 58 IN | OXYGEN SATURATION: 100 % | TEMPERATURE: 98 F | SYSTOLIC BLOOD PRESSURE: 114 MMHG | BODY MASS INDEX: 21.83 KG/M2 | RESPIRATION RATE: 20 BRPM

## 2025-04-03 DIAGNOSIS — Z32.02 PREGNANCY EXAMINATION OR TEST, NEGATIVE RESULT: Primary | ICD-10-CM

## 2025-04-03 DIAGNOSIS — Z30.09 BIRTH CONTROL COUNSELING: ICD-10-CM

## 2025-04-03 LAB — HCG UR QL: NEGATIVE

## 2025-04-03 NOTE — PROGRESS NOTES
"  {PROVIDER CHARTING PREFERENCE:473136}    Subjective   Belkis is a 29 year old, presenting for the following health issues:  [place]al  (Missed over a month )        4/3/2025     8:03 AM   Additional Questions   Roomed by Arabella godwin         4/3/2025    Information    services provided? Yes   Language Hmong   Type of interpretation provided Face-to-face    name hilton solano    [employer]    phone number [phone]     HPI      LMP:  Last intercourse:    Planned?  Desired?      Review of Systems  Constitutional, HEENT, cardiovascular, pulmonary, gi and gu systems are negative, except as otherwise noted.      Objective    /71   Pulse 70   Temp 98  F (36.7  C)   Resp 20   Ht 1.485 m (4' 10.47\")   Wt 47.2 kg (104 lb)   LMP 01/28/2025 (Exact Date)   SpO2 100%   BMI 21.39 kg/m    Body mass index is 21.39 kg/m .  Physical Exam   {Exam List (Optional):775824}    {Diagnostic Test Results (Optional):202969}        Signed Electronically by: Loco Montoya MD  {Email feedback regarding this note to [email or web address]   :[id number]}  "

## 2025-04-03 NOTE — PROGRESS NOTES
Preceptor Attestation:   Patient seen, evaluated and discussed with the resident. I have verified the content of the note, which accurately reflects my assessment of the patient and the plan of care.    Supervising Physician:Lashonda Gill MD    [facility]

## 2025-04-03 NOTE — PROGRESS NOTES
"  Assessment & Plan     Pregnancy examination or test, negative result  Birth control counseling   UPT neg. LMP 2/25 - 26, last intercourse 1-2wks ago. Missed March period. Not interested in having children at this time. Suspect this could be related to menstrual suppression on GARCIA for psychosis.  - Reviewed contraceptions with patient as she expressed not wanting to have children at this time. She declined.   - Counseled patient on follow-up for secondary amenorrhea in next 1-2mo if this persists      Subjective   Belkis is a 29 year old, presenting for the following health issues:  Mustrual  (Missed over a month )      4/3/2025     8:03 AM   Additional Questions   Roomed by Arabella godwin         4/3/2025    Information    services provided? Yes   Language Hmong   Type of interpretation provided Face-to-face    name hilton [facility]    [employer]    phone number [phone]     HPI      LMP 2/25 - 26/25, last intercourse 1-2wk   Period was lighter than usual.   No changes in urination     Doesn't want children moving forward.     On Abilify    Review of Systems  Constitutional, HEENT, cardiovascular, pulmonary, gi and gu systems are negative, except as otherwise noted.      Objective    /71   Pulse 70   Temp 98  F (36.7  C)   Resp 20   Ht 1.485 m (4' 10.47\")   Wt 47.2 kg (104 lb)   LMP 02/25/2025 (Approximate)   SpO2 100%   BMI 21.39 kg/m    Body mass index is 21.39 kg/m .  Physical Exam   GENERAL: Active, alert, in no acute distress.  LUNGS: Normal WOB, no respiratory distress  HEART: Appears well-perfused.  MSK: ROM of all 4 extremities grossly intact, no BLE edema on gross examination   SKIN: No obvious lesions on gross examination  NEUROLOGIC: Normal tone throughout. Normal reflexes for age  PSYCH: Mood appropriate, normal affect          Signed Electronically by: Loco Montoya MD    "

## 2025-04-07 ENCOUNTER — PATIENT OUTREACH (OUTPATIENT)
Dept: CARE COORDINATION | Facility: CLINIC | Age: 30
End: 2025-04-07
Payer: COMMERCIAL

## 2025-04-21 ENCOUNTER — PATIENT OUTREACH (OUTPATIENT)
Dept: CARE COORDINATION | Facility: CLINIC | Age: 30
End: 2025-04-21
Payer: COMMERCIAL

## 2025-05-05 ENCOUNTER — PATIENT OUTREACH (OUTPATIENT)
Dept: CARE COORDINATION | Facility: CLINIC | Age: 30
End: 2025-05-05
Payer: COMMERCIAL

## 2025-07-03 ENCOUNTER — OFFICE VISIT (OUTPATIENT)
Dept: URGENT CARE | Facility: URGENT CARE | Age: 30
End: 2025-07-03
Payer: COMMERCIAL

## 2025-07-03 VITALS
SYSTOLIC BLOOD PRESSURE: 106 MMHG | BODY MASS INDEX: 22.25 KG/M2 | DIASTOLIC BLOOD PRESSURE: 64 MMHG | HEIGHT: 58 IN | HEART RATE: 84 BPM | OXYGEN SATURATION: 97 % | WEIGHT: 106 LBS | RESPIRATION RATE: 20 BRPM | TEMPERATURE: 98.4 F

## 2025-07-03 DIAGNOSIS — N39.0 ACUTE UTI: ICD-10-CM

## 2025-07-03 DIAGNOSIS — B96.89 BACTERIAL VAGINOSIS: Primary | ICD-10-CM

## 2025-07-03 DIAGNOSIS — N76.0 BACTERIAL VAGINOSIS: Primary | ICD-10-CM

## 2025-07-03 PROBLEM — F20.0 SCHIZOPHRENIA, PARANOID (H): Status: ACTIVE | Noted: 2025-01-07

## 2025-07-03 LAB
ALBUMIN UR-MCNC: NEGATIVE MG/DL
APPEARANCE UR: ABNORMAL
BACTERIA #/AREA URNS HPF: ABNORMAL /HPF
BILIRUB UR QL STRIP: NEGATIVE
CLUE CELLS: PRESENT
COLOR UR AUTO: YELLOW
GLUCOSE UR STRIP-MCNC: NEGATIVE MG/DL
HGB UR QL STRIP: NEGATIVE
KETONES UR STRIP-MCNC: NEGATIVE MG/DL
LEUKOCYTE ESTERASE UR QL STRIP: ABNORMAL
NITRATE UR QL: NEGATIVE
PH UR STRIP: 6 [PH] (ref 5–8)
RBC #/AREA URNS AUTO: ABNORMAL /HPF
SP GR UR STRIP: 1.01 (ref 1–1.03)
SQUAMOUS #/AREA URNS AUTO: ABNORMAL /LPF
TRICHOMONAS, WET PREP: ABNORMAL
UROBILINOGEN UR STRIP-ACNC: 1 E.U./DL
WBC #/AREA URNS AUTO: ABNORMAL /HPF
WBC'S/HIGH POWER FIELD, WET PREP: ABNORMAL
YEAST, WET PREP: ABNORMAL

## 2025-07-03 RX ORDER — NITROFURANTOIN 25; 75 MG/1; MG/1
100 CAPSULE ORAL 2 TIMES DAILY
Qty: 10 CAPSULE | Refills: 0 | Status: SHIPPED | OUTPATIENT
Start: 2025-07-03 | End: 2025-07-08

## 2025-07-03 RX ORDER — METRONIDAZOLE 7.5 MG/G
1 GEL VAGINAL DAILY
Qty: 25 G | Refills: 0 | Status: SHIPPED | OUTPATIENT
Start: 2025-07-03 | End: 2025-07-08

## 2025-07-03 NOTE — PROGRESS NOTES
Urgent Care Clinic Visit    Chief Complaint   Patient presents with    Urgent Care     Pt states producing saliva x yesterday.     UTI     X a month. Frequency and urgency, little burning with urination, vaginal discharge, no vaginal itching. No foul odor. No abdominal pain.                7/3/2025     5:23 PM   Additional Questions   Roomed by Gerald Russell MA   Accompanied by Self     Pre-Provider Visit Orders- Urinalysis UA/UC  Patient reports the following symptoms:  possible urinary tract infection (UTI) , possible bladder infection , discomfort, pain or burning with urination , and frequent urination   Does the patient report any of the following symptoms: vaginal discharge, vaginal itching, possible yeast infection, has a urinary catheter in place, or unable to void in a specimen cup?  Patient complains of vaginal discharge         Gerald Russell MA on 07/03/2025 at 5:30 PM

## 2025-07-04 ENCOUNTER — HOSPITAL ENCOUNTER (EMERGENCY)
Facility: HOSPITAL | Age: 30
Discharge: HOME | End: 2025-07-04
Payer: COMMERCIAL

## 2025-07-04 ENCOUNTER — APPOINTMENT (OUTPATIENT)
Dept: ULTRASOUND IMAGING | Facility: HOSPITAL | Age: 30
End: 2025-07-04
Payer: COMMERCIAL

## 2025-07-04 VITALS
OXYGEN SATURATION: 100 % | HEART RATE: 69 BPM | BODY MASS INDEX: 22.06 KG/M2 | SYSTOLIC BLOOD PRESSURE: 104 MMHG | WEIGHT: 105.1 LBS | DIASTOLIC BLOOD PRESSURE: 64 MMHG | TEMPERATURE: 97.8 F | RESPIRATION RATE: 18 BRPM | HEIGHT: 58 IN

## 2025-07-04 DIAGNOSIS — Z32.01 PREGNANCY TEST POSITIVE: ICD-10-CM

## 2025-07-04 LAB
ALBUMIN UR-MCNC: NEGATIVE MG/DL
APPEARANCE UR: CLEAR
BACTERIA #/AREA URNS HPF: ABNORMAL /HPF
BILIRUB UR QL STRIP: NEGATIVE
COLOR UR AUTO: COLORLESS
GLUCOSE UR STRIP-MCNC: NEGATIVE MG/DL
HCG INTACT+B SERPL-ACNC: ABNORMAL MIU/ML
HGB UR QL STRIP: NEGATIVE
KETONES UR STRIP-MCNC: NEGATIVE MG/DL
LEUKOCYTE ESTERASE UR QL STRIP: ABNORMAL
NITRATE UR QL: NEGATIVE
PH UR STRIP: 6 [PH] (ref 5–7)
RBC URINE: 0 /HPF
SP GR UR STRIP: 1.01 (ref 1–1.03)
UROBILINOGEN UR STRIP-MCNC: NORMAL MG/DL
WBC URINE: 3 /HPF

## 2025-07-04 PROCEDURE — 99284 EMERGENCY DEPT VISIT MOD MDM: CPT | Mod: 25

## 2025-07-04 PROCEDURE — 36415 COLL VENOUS BLD VENIPUNCTURE: CPT

## 2025-07-04 PROCEDURE — 76817 TRANSVAGINAL US OBSTETRIC: CPT

## 2025-07-04 PROCEDURE — 87086 URINE CULTURE/COLONY COUNT: CPT

## 2025-07-04 PROCEDURE — 84702 CHORIONIC GONADOTROPIN TEST: CPT

## 2025-07-04 PROCEDURE — 81001 URINALYSIS AUTO W/SCOPE: CPT

## 2025-07-04 RX ORDER — CEPHALEXIN 500 MG/1
500 CAPSULE ORAL 4 TIMES DAILY
Qty: 20 CAPSULE | Refills: 0 | Status: SHIPPED | OUTPATIENT
Start: 2025-07-04 | End: 2025-07-09

## 2025-07-04 RX ORDER — ONDANSETRON 4 MG/1
4 TABLET, ORALLY DISINTEGRATING ORAL EVERY 8 HOURS PRN
Qty: 10 TABLET | Refills: 0 | Status: SHIPPED | OUTPATIENT
Start: 2025-07-04 | End: 2025-07-07

## 2025-07-04 ASSESSMENT — COLUMBIA-SUICIDE SEVERITY RATING SCALE - C-SSRS
2. HAVE YOU ACTUALLY HAD ANY THOUGHTS OF KILLING YOURSELF IN THE PAST MONTH?: NO
1. IN THE PAST MONTH, HAVE YOU WISHED YOU WERE DEAD OR WISHED YOU COULD GO TO SLEEP AND NOT WAKE UP?: NO
6. HAVE YOU EVER DONE ANYTHING, STARTED TO DO ANYTHING, OR PREPARED TO DO ANYTHING TO END YOUR LIFE?: NO

## 2025-07-04 ASSESSMENT — ACTIVITIES OF DAILY LIVING (ADL)
ADLS_ACUITY_SCORE: 41

## 2025-07-04 NOTE — ED TRIAGE NOTES
Patient presents to ER for evaluation of possible pregnancy. Patient states she took a OTC pregnancy test which was positive. Patient states she has been nauseous for past week. Patient states last period was about 1-2 months ago. Patient would like to terminate pregnancy and needs further resources if pregnant.     Triage Assessment (Adult)       Row Name 07/04/25 1522          Triage Assessment    Airway WDL WDL        Respiratory WDL    Respiratory WDL WDL        Skin Circulation/Temperature WDL    Skin Circulation/Temperature WDL WDL        Cardiac WDL    Cardiac WDL WDL        Peripheral/Neurovascular WDL    Peripheral Neurovascular WDL WDL        Cognitive/Neuro/Behavioral WDL    Cognitive/Neuro/Behavioral WDL WDL

## 2025-07-04 NOTE — ED PROVIDER NOTES
Emergency Department Encounter   NAME: Belkis Sanchez  AGE: 29 year old female   YOB: 1995 ;   MRN: [id number] ;    ED PROVIDER: Regina Ferreira PA-C    PCP: Loco Montoya    Evaluation Date & Time:   7/4/2025  3:26 PM    CHIEF COMPLAINT:  Pregnancy Test      FINAL IMPRESSION:    ICD-10-CM    1. Pregnancy test positive  Z32.01 Ob/Gyn  Referral            IMPRESSION AND PLAN   MDM: Belkis Sanchez is a 29 year old female with a pertinent history of schizophrenia who presents to the ED by walk-in for evaluation of positive UPT at home today.    Vitals stable. On exam patient is well-appearing and in no acute distress. Abdomen is soft and diffusely nontender. Remainder of exam as detailed below. Patient denies pelvic cramping, vaginal bleeding. Will test pregnancy via blood test with plans to order pelvic US to ensure IUP.     Hcg quant elevated at almost 34,000. Patient was complaining of dysuria, hematuria and increased urinary frequency about 1-2 weeks ago. She was evaluated for these symptoms just yesterday and prescribed macrobid for suspect UTI. Today, UA is positive for leukocytes and bacteria, culture pending. Will plan to treat with Keflex instead of macrobid given positive pregnancy test. Pelvic US confirmed an IUP with gestational age of 6w5d however, there is no fetal pole visualized concerning for a nonviable pregnancy. I discussed these findings with patient and will provide an OBGYN referral for reevaluation early next week. In the meantime, I discussed strict return precautions to which she expressed her understanding. All questions and concerns addressed. Patient is overall agreeable to this plan and feels comfortable with discharge at this time.      MIPS (CTPE, Dental pain, Hwang, Sinusitis, Asthma/COPD, Head Trauma): Not Applicable    SEPSIS: None      ED COURSE:  3:46 PM I met and introduced myself to the patient. I gathered initial history and performed my physical exam. We discussed plan  for initial workup.   6:07 PM I rechecked the patient and discussed results, discharge, follow up, and reasons to return to the ED.           MEDICATIONS GIVEN IN THE EMERGENCY DEPARTMENT:  Medications - No data to display      NEW PRESCRIPTIONS STARTED AT TODAY'S ED VISIT:  New Prescriptions    CEPHALEXIN (KEFLEX) 500 MG CAPSULE    Take 1 capsule (500 mg) by mouth 4 times daily for 5 days.    ONDANSETRON (ZOFRAN ODT) 4 MG ODT TAB    Take 1 tablet (4 mg) by mouth every 8 hours as needed for nausea.         BRIEF HPI   Patient information was obtained from: Patient   Use of Intrepreter: N/A     Belkis Sanchez is a 29 year old female who presents to the ED by walk-in for evaluation of a positive at home UPT. Patient reports she has missed her period the last 1-2 months and has been feeling more nauseous and fatigued which is what prompted her to take the UPT today. She is presenting today to confirm the pregnancy. She otherwise denies any abdominal cramping, vaginal bleeding.    Of note, patient states 1-2 weeks ago she was experiencing dysuria, hematuria and increased urinary frequency. She was recently seen for this and prescribed an antibiotic for a UTI. She is not able to  the medication until tomorrow.        REVIEW OF SYSTEMS:  Pertinent positive and negative symptoms per HPI.       MEDICAL HISTORY     Past Medical History:   Diagnosis Date    Anemia     Motion sickness        Past Surgical History:   Procedure Laterality Date    C/SECTION, CLASSICAL      LAPAROSCOPIC CHOLECYSTECTOMY N/A 2024    Procedure: CHOLECYSTECTOMY, LAPAROSCOPIC AND EXCISION OF MASS FROM  SECTION SCAR;  Surgeon: David Lilly MD;  Location: [place] [facility] OR       Family History   Problem Relation Age of Onset    No Known Problems Mother     No Known Problems Father     No Known Problems Maternal Grandmother     No Known Problems Maternal Grandfather     No Known Problems Paternal Grandmother     No Known Problems Paternal  Grandfather     No Known Problems Brother     No Known Problems Sister     No Known Problems Son     No Known Problems Daughter     No Known Problems Maternal Half-Brother     No Known Problems Maternal Half-Sister     No Known Problems Paternal Half-Brother     No Known Problems Paternal Half-Sister     No Known Problems Niece     No Known Problems Nephew     No Known Problems Cousin     No Known Problems Other     Cancer No family hx of     Diabetes No family hx of     Coronary Artery Disease No family hx of     Heart Disease No family hx of     Hypertension No family hx of     Hyperlipidemia No family hx of     Kidney Disease No family hx of     Cerebrovascular Disease No family hx of     Obesity No family hx of     Thrombosis No family hx of     Asthma No family hx of     Arthritis No family hx of     Thyroid Disease No family hx of     Depression No family hx of     Mental Illness No family hx of     Substance Abuse No family hx of     Cystic Fibrosis No family hx of     Early Death No family hx of     Coronary Artery Disease Early Onset No family hx of     Heart Failure No family hx of     Bleeding Diathesis No family hx of     Dementia No family hx of     Breast Cancer No family hx of     Ovarian Cancer No family hx of     Uterine Cancer No family hx of     Prostate Cancer No family hx of     Colorectal Cancer No family hx of     Pancreatic Cancer No family hx of     Lung Cancer No family hx of     Melanoma No family hx of     Autoimmune Disease No family hx of     Unknown/Adopted No family hx of     Genetic Disorder No family hx of        Social History     Tobacco Use    Smoking status: Never     Passive exposure: Never    Smokeless tobacco: Never   Vaping Use    Vaping status: Never Used   Substance Use Topics    Alcohol use: Never    Drug use: Never         PHYSICAL EXAM     First Vitals:  Patient Vitals for the past 24 hrs:   BP Temp Temp src Pulse Resp SpO2 Height Weight   07/04/25 1745 -- -- -- -- --  "100 % -- --   07/04/25 1740 104/64 -- -- 69 -- 99 % -- --   07/04/25 1735 -- -- -- 71 -- 99 % -- --   07/04/25 1700 95/63 -- -- 66 -- 98 % -- --   07/04/25 1655 98/62 -- -- -- -- -- -- --   07/04/25 1519 109/70 97.8  F (36.6  C) Temporal 78 18 98 % 1.473 m (4' 10\") 47.7 kg (105 lb 1.6 oz)       PHYSICAL EXAM:  Physical Exam  Vitals and nursing note reviewed.   Constitutional:       General: She is not in acute distress.     Appearance: Normal appearance. She is normal weight. She is not ill-appearing or diaphoretic.   HENT:      Head: Normocephalic and atraumatic.      Mouth/Throat:      Mouth: Mucous membranes are moist.   Eyes:      Conjunctiva/sclera: Conjunctivae normal.   Cardiovascular:      Rate and Rhythm: Normal rate.   Pulmonary:      Effort: Pulmonary effort is normal.   Abdominal:      General: Abdomen is flat. There is no distension.      Palpations: Abdomen is soft.      Tenderness: There is no abdominal tenderness. There is no guarding.   Musculoskeletal:      Cervical back: Neck supple.   Skin:     General: Skin is warm and dry.   Neurological:      General: No focal deficit present.      Mental Status: She is alert and oriented to person, place, and time.   Psychiatric:         Mood and Affect: Mood normal.          RESULTS     LAB:  All pertinent labs reviewed and interpreted  Labs Ordered and Resulted from Time of ED Arrival to Time of ED Departure   HCG QUANTITATIVE PREGNANCY - Abnormal       Result Value    hCG Quantitative 33,986 (*)    ROUTINE UA WITH MICROSCOPIC REFLEX TO CULTURE - Abnormal    Color Urine Colorless      Appearance Urine Clear      Glucose Urine Negative      Bilirubin Urine Negative      Ketones Urine Negative      Specific Gravity Urine 1.011      Blood Urine Negative      pH Urine 6.0      Protein Albumin Urine Negative      Urobilinogen Urine Normal      Nitrite Urine Negative      Leukocyte Esterase Urine 250 Virgil/uL (*)     Bacteria Urine Few (*)     RBC Urine 0      WBC " Urine 3     URINE CULTURE       RADIOLOGY:  US OB <14 Weeks W Transvaginal   Final Result   IMPRESSION:       1.  Intrauterine gestational sac present, though no fetal pole identified. The gestational sac size suggests 6 weeks and 5 days. Findings are concerning for nonviable pregnancy. Recommend follow-up beta hCG levels (and imaging if hCG levels rise).                    Regina Ferreira PA-C  Emergency Medicine   [facility] EMERGENCY DEPARTMENT       Regina Ferreira PA-C  07/04/25 4688

## 2025-07-04 NOTE — DISCHARGE INSTRUCTIONS
Your pregnancy test was positive and the ultrasound reveals you are 6 weeks 5 days pregnant (about 1.5 months). However, the ultrasound did not show a developing embryo so they are concerned that the pregnancy may not be viable. I provided a referral to OBGYN for further evaluation and management and would like you to make an appointment for next week. I will also prescribe a different antibiotic that's safe in pregnancy and some nausea medicine as well. Please return to the ED for any new or worsening symptoms including vaginal bleeding or cramping, lightheadedness or dizziness.     Koj qhov mackenzie kuaj cev xeeb tub tau zoo thiab qhov ultrasound qhia tau tias koj muaj 6 lub lis piam 5 hnub cev xeeb tub (kwv yees li 1.5 lub hlis). Txawm li ricki los xij, ultrasound tsis tau qhia txog mackenzie loj hlob embryo yog li lawv txhawj xeeb tias mackenzie xeeb tub yuav tsis ua haujlwm tau. Kuv tau xa mus jc OBGYN jc mackenzie ntsuam xyuas thiab mackenzie tswj xyuas ntxiv thiab xav kom koj teem caij jc lub jones tiam michelle ntej. Kuv tseem yuav muab tshuaj tua kab mob sib txawv uas muaj mackenzie nyab xeeb thaum cev xeeb tub thiab qee cov tshuaj xeev siab thiab. Thov rov qab mus jc ED jc cov tsos mob tshiab lossis mob hnyav ntxiv nrog jc qhov chaw mos los ntshav los yog cramping, lightheadedness los yog kiv leonides conrad.

## 2025-07-05 ENCOUNTER — RESULTS FOLLOW-UP (OUTPATIENT)
Dept: NURSING | Facility: CLINIC | Age: 30
End: 2025-07-05

## 2025-07-05 LAB
BACTERIA UR CULT: NO GROWTH
BACTERIA UR CULT: NORMAL

## 2025-07-08 ENCOUNTER — OFFICE VISIT (OUTPATIENT)
Dept: FAMILY MEDICINE | Facility: CLINIC | Age: 30
End: 2025-07-08
Payer: COMMERCIAL

## 2025-07-08 ENCOUNTER — VIRTUAL VISIT (OUTPATIENT)
Dept: INTERPRETER SERVICES | Facility: CLINIC | Age: 30
End: 2025-07-08

## 2025-07-08 VITALS
HEART RATE: 65 BPM | SYSTOLIC BLOOD PRESSURE: 98 MMHG | OXYGEN SATURATION: 98 % | TEMPERATURE: 98.9 F | DIASTOLIC BLOOD PRESSURE: 61 MMHG | RESPIRATION RATE: 18 BRPM

## 2025-07-08 DIAGNOSIS — Z13.9 SCREENING FOR CONDITION: ICD-10-CM

## 2025-07-08 DIAGNOSIS — O36.80X0 PREGNANCY WITH INCONCLUSIVE FETAL VIABILITY, SINGLE OR UNSPECIFIED FETUS: ICD-10-CM

## 2025-07-08 DIAGNOSIS — Z33.2 TERMINATION OF PREGNANCY (FETUS): Primary | ICD-10-CM

## 2025-07-08 RX ORDER — MISOPROSTOL 200 UG/1
800 TABLET ORAL ONCE
Qty: 4 TABLET | Refills: 1 | Status: SHIPPED | OUTPATIENT
Start: 2025-07-08 | End: 2025-07-08

## 2025-07-08 RX ORDER — IBUPROFEN 800 MG/1
800 TABLET, FILM COATED ORAL EVERY 8 HOURS PRN
Qty: 21 TABLET | Refills: 0 | Status: SHIPPED | OUTPATIENT
Start: 2025-07-08

## 2025-07-08 RX ORDER — MIFEPRISTONE 200 MG/1
200 TABLET ORAL ONCE
Qty: 1 TABLET | Refills: 0 | Status: SHIPPED | OUTPATIENT
Start: 2025-07-08 | End: 2025-07-08

## 2025-07-08 RX ORDER — LOPERAMIDE HYDROCHLORIDE 2 MG/1
2 TABLET ORAL 4 TIMES DAILY PRN
Qty: 10 TABLET | Refills: 0 | Status: SHIPPED | OUTPATIENT
Start: 2025-07-08

## 2025-07-08 RX ORDER — ACETAMINOPHEN 325 MG/1
650 TABLET ORAL EVERY 6 HOURS PRN
Qty: 56 TABLET | Refills: 0 | Status: SHIPPED | OUTPATIENT
Start: 2025-07-08

## 2025-07-08 RX ORDER — ONDANSETRON 4 MG/1
4 TABLET, FILM COATED ORAL EVERY 6 HOURS PRN
Qty: 10 TABLET | Refills: 0 | Status: SHIPPED | OUTPATIENT
Start: 2025-07-08

## 2025-07-08 ASSESSMENT — PATIENT HEALTH QUESTIONNAIRE - PHQ9: SUM OF ALL RESPONSES TO PHQ QUESTIONS 1-9: 2

## 2025-07-08 NOTE — Clinical Note
See note.  Likely non-viable pregnancy, treated through MTOP process as patient does not desire pregnancy and did not want to wait for definitive confirmation of non-viable status prior to moving forward with MTOP.  Will take meds Friday and Saturday.  Please call Mon or Tues next week.

## 2025-07-08 NOTE — PROGRESS NOTES
2025           Subjective:  Belkis Andrew is a 29 year old  at  7+3 weeks gestation by early US 25 here today for medication .  Of note US from 25 demonstrates a single intrauterine gestational sac with no fetal pole idendified, concerning for non-viable pregnancy  Patient clearly states that she does not wish to be pregnant regardless of viability of current pregnancy.   Patient has been counseled on pregnancy options and desires medical termination of pregnancy.       Objective:  Dating:  Patient's last menstrual period was 2025 (within days).      OB History    Para Term  AB Living   1 1 1 0 0 1   SAB IAB Ectopic Multiple Live Births   0 0 0 0 1      # Outcome Date GA Lbr Napoleon/2nd Weight Sex Type Anes PTL Lv   1 Term 21 40w1d  3.97 kg (8 lb 12 oz) F CS-LTranv Spinal N TEVIN      Name: ALEIDA ANDREW      Apgar1: 8  Apgar5: 9        Past Medical History:   Diagnosis Date    Anemia     Motion sickness        Past Surgical History:   Procedure Laterality Date    C/SECTION, CLASSICAL      LAPAROSCOPIC CHOLECYSTECTOMY N/A 2024    Procedure: CHOLECYSTECTOMY, LAPAROSCOPIC AND EXCISION OF MASS FROM  SECTION SCAR;  Surgeon: David Lilly MD;  Location: [facility]       Current Outpatient Medications   Medication Sig Dispense Refill    ARIPiprazole (ABILIFY) 15 MG tablet Take 15 mg by mouth daily. (Patient not taking: Reported on 7/3/2025)      ARIPiprazole ER (ABILIFY MAINTENA) 300 MG extended release injection Inject 300 mg into the muscle every 28 days. (Patient not taking: Reported on 7/3/2025)      cephALEXin (KEFLEX) 500 MG capsule Take 1 capsule (500 mg) by mouth 4 times daily for 5 days. 20 capsule 0    metroNIDAZOLE (METROGEL) 0.75 % vaginal gel Place 1 applicator (5 g) vaginally daily for 5 days. 25 g 0    nitroFURantoin macrocrystal-monohydrate (MACROBID) 100 MG capsule Take 1 capsule (100 mg) by mouth 2 times daily for 5 days. 10 capsule 0  "    No current facility-administered medications for this visit.       No Known Allergies    Social History     Tobacco Use    Smoking status: Never     Passive exposure: Never    Smokeless tobacco: Never   Vaping Use    Vaping status: Never Used   Substance Use Topics    Alcohol use: Never    Drug use: Never        Vitals:    25 1014   BP: 98/61   Pulse: 65   Resp: 18   Temp: 98.9  F (37.2  C)   SpO2: 98%       Estimated body mass index is 21.97 kg/m  as calculated from the following:    Height as of 25: 1.473 m (4' 10\").    Weight as of 25: 47.7 kg (105 lb 1.6 oz).     Gen: well-appearing, cooperative, well-groomed  Narrative & Impression   EXAM: US OB <14 WEEKS WITH TRANSVAGINAL SINGLE  LOCATION: [facility]  DATE: 2025     INDICATION: positive UPT today, unsure of LMP  COMPARISON: None.  TECHNIQUE: Transabdominal scans were performed. Endovaginal ultrasound was performed to better visualize the embryo.     FINDINGS:     UTERUS: Single intrauterine gestation sac with a mean sac diameter of 1.8 cm, suggesting 6 weeks and 5 days. No fetal pole identified. Yolk sac present. No subchorionic hemorrhage.     RIGHT OVARY: Normal.     LEFT OVARY: Normal.     OTHER: Trace free pelvic fluid.                                                                      IMPRESSION:      1.  Intrauterine gestational sac present, though no fetal pole identified. The gestational sac size suggests 6 weeks and 5 days. Findings are concerning for nonviable pregnancy. Recommend follow-up beta hCG levels (and imaging if hCG levels rise).        Results for orders placed or performed in visit on 25   Beta-HCG Quantitative     Status: Abnormal   Result Value Ref Range    hCG Quantitative 62,919 (H) <5 mIU/mL         Assessment and Plan:  Belkis Sanchez is a 29 year old  with likely, but not definitive, non-viable pregnancy at 7 weeks 3 days as dated by Previous US who desires termination of " pregnancy with medication .     The patient is appropriate for medication  with:  mifepristone and misoprostol.     Prior to the administration/prescribing of mifepristone, the patient received the medication guide and signed the patient agreement.      Mifepristone prescribed to participating pharmacy. Patient plans to take misoprostol by buccal route.   .    Counseled patient on   - expected bleeding: Bleeding typically starts 2-4 hours after misoprostol and can be heavy for up to 2 hours. Once pregnancy tissue passes, bleeding should slow down to menstrual type flow but can last up to 2 weeks. Patient counseled to contact our clinic or present to Emergency Department in the case of heavy bleeding (soaking more than two maxi pads per hour for 2 consecutive hours). Also counseled to contact clinic if no bleeding within 24 hours after taking misoprostol.  - pain: Counseled patient that the most severe pain occurs approximately 2-4 hours after misoprostol use and lasts 1-2 hours. Advised patient to take ibuprofen 800 mg with misoprostol and repeat as needed every 8 hours. Patient may also alternate with acetaminophen for added pain control (acetaminophen 650 mg every 6 hours).   - potential side effects of misoprostol: nausea, vomiting, diarrhea, headache, dizziness, and thermoregulatory effects such as fever, warmth, hot flushes, or chills  - follow up plan options:   Home Pregnancy Test - Take a urine pregnancy test four weeks after taking  medication(s).  Blood Test - Get a blood test on the day of your appointment or the day you take your first dose of medication to measure the amount of pregnancy hormone (HCG) in your blood.  Get another HCG blood test 1-2 weeks after taking  medication(s).   Ultrasound - Get a vaginal ultrasound 1-2 weeks after taking  medication(s).  Patient plans two blood tests for follow up.  Also offered to discuss any contraceptive needs/plans with  the patient today. Patient plans unsure.  Plans to discuss at follow up visit in 2 weeks.     Chart routed to RN team who will follow up with patient via telephone within 1 week after clinic visit to assess patient.      Wendy Burnett MD

## 2025-07-08 NOTE — PATIENT INSTRUCTIONS
MEDICATION TERMINATION OF PREGNANCY USING MIFEPRISTONE AND MISOPROSTOL    HOW DOES IT WORK?    Mifepristone and misoprostol together are medications that can be taken to end your pregnancy.      HOW WELL DOES THE MEDICATION WORK?    Medication  is highly effective. Medication  is highly effective; it has a success rate of >95% using the standard protocol. Medication  is safe. Serious complications requiring hospitalization for infection treatment or transfusion occur in <0.4% of patients under the standard protocol?. Failed or incomplete medication  may require a suction procedure to complete.    WHAT DO I DO?    You took one tablet of 200 mg mifepristone today during your visit or will take it at home as directed by your provider.   After taking the mifepristone, use the misoprostol.  There are two ways to take misoprostol: vaginal or buccal (between cheek and gum in your mouth).   Vaginal Use of Misoprostol (may be inserted immediately or up to 48 hrs after mifepristone)   Wash your hands and lie down. Place the 4 misoprostol tablets one at a time up into the vagina as far as you can using your finger. It doesn t matter where in the vagina you put the pills. Each misoprostol pill contains 200 micrograms.    If your provider has recommended a second dose of misoprostol, repeat the process 4 hours later with an additional 4 tablets of misoprostol. If you have started to bleed, you can put the pills in your cheeks (between your cheek and your gums), instead of your vagina, for 30 minutes. See  Buccal Use of Misoprostol  below.    Buccal Use of Misoprostol (should be taken between 24-48 hrs after mifepristone)   Place 2 tablets of misoprostol in each cheek (between your cheek and your gums), four tablets total.    Leave them in your cheeks for 30 minutes to dissolve.    After 30 minutes swallow the pills with a large glass of water.   If your provider has recommended a second dose of  misoprostol, repeat steps i. through iii. above after 4 hours.    You may take ibuprofen (800 mg total) and/or acetaminophen (650mg) by mouth one hour before using the misoprostol. This may help with cramping. See further information on pain management below.       WHAT HAPPENS NEXT?   Vaginal bleeding with clots is an expected part of this process. The cramps and bleeding may be stronger than your normal period. The cramps and heavy bleeding usually start 2 to 4 hours after you use the misoprostol pills. This heavy bleeding means the pills are working. After the pregnancy tissue passes, the bleeding will slow down and get lighter and lighter. It is normal to pass small clots and sometimes the bleeding may seem to increase when you get up suddenly or go to the toilet. Bleeding may continue on and off for up to 4 weeks.     Lower abdominal cramping pain, especially in the middle of your lower abdomen can occur any time after you take the misoprostol.? Cramping may be similar or sometimes much greater than with a menstrual period and can last for a couple of days. If you continue to have pain and cramps after taking the ibuprofen (as directed above), then you can use additional pain medicine such as acetaminophen (Tylenol).?   Nausea, diarrhea: These symptoms should get better a few hours after using the pills and should not last longer than 24 hours.    Fever and chills: You may have fever and chills the day you take the misoprostol. It is NOT normal to have a fever after that. Call us right away if you do. It could be a sign that you are getting an infection.   You will receive a phone call from a clinic nurse within a week of your visit.    You can expect a period in 4 to 8 weeks after using mifepristone and misoprostol but this varies quite a bit depending upon a person s normal menstrual cycle.      WHAT CAN I TAKE FOR PAIN, NAUSEA, DIARRHEA?    Pain:    Take ibuprofen (Motrin or Advil) 800 mg every 8 hours as  needed for pain. Take this with food to avoid an upset stomach. You may also alternate this with acetaminophen (Tylenol) 650mg every 6 hours for added pain control.   Comfort: A hot pack may help with cramps. You can also drink some hot tea. Rest in a soothing place.    Nausea   Take ondansetron or promethazine as needed for nausea and vomiting as needed for nausea and vomiting if prescribed.   Diarrhea   Take Loperamide as needed for diarrhea. Take 2 capsules after the first loose bowel movement. Can take 1 capsule after each additional loose stool. Do not take more than 8 capsules in a day.     WHEN SHOULD I CALL MY HEALTHCARE PROVIDER?    Your bleeding soaks through more than 2 maxi pads per hour for 2 hours in a row   You do not bleed within 24 hours after taking the misoprostol   You continue to feel sick more than 24 hours after taking the misoprostol:   Fever on an oral thermometer of 100.4 degrees Fahrenheit, severe stomach area (abdominal) pain, weakness, nausea, vomiting, or diarrhea     The clinic phone is answered 24 hours per day. If it is after clinic hours, leave a message with the answering service and a health care provider will call you back. Please call if you have any questions, think something is going wrong, or think you have an emergency. If you do not receive a call back within an hour, go to the nearest emergency room.              FEELINGS AFTER ENDING PREGNANCY    People have many different feelings after using the medications to end a pregnancy. The most common emotion people report is relief, but people can also feel many other emotions. If you think your emotions are not what they should be, please talk to us.        References:   American College of Obstetricians and Gynecologists  Committee on Practice Bulletins--Gynecology, Society of Family Planning. Medication  Up to 70 Days of Gestation: ACOG Practice Bulletin, Number 225. Obstet Gynecol. 2020;136(4):e31-e47. doi:  10.1097/AOG.[id number].    Reproductive Health Access Project. Medication  Aftercare Instructions (vaginal miso). May 2022. Available at: https://[email or web address].org/wp-content/uploads/[email or web address]   Reproductive Health Access Project. Medication  Aftercare Instructions (buccal miso). May 2022. Available at: [email or web address].org/wp-content/[email or web address]-[email or web address]-[email or web address]_[email or web address]

## 2025-07-08 NOTE — PROGRESS NOTES
2025     Pre-Medication  Assessment:    Belkis Sanchez    When was the first day of your last period? Patient's last menstrual period was 2025 (within days). Patient is sure of LMP date.    When was your positive pregnancy test?        Was the test more than 52 days ago (before 25)? No    Were you using hormonal birth control, an IUD or emergency contraception when you got pregnant? No    Have you ever been diagnosed with an ectopic pregnancy? No    Have you ever had a tubal ligation or sterilization procedure? No    Have you ever been diagnosed with pelvic inflammatory disease? No    Are you having one-sided pelvic pain? No    How long are your typical menstrual cycles /  How many days from the start of one period to the start of the next one?  5-6 days long, 1 month in between      During the past 3 months, has the time between periods varied from your typical cycles by more than a few days? No, cycles have been regular.    Was LMP more than 10 weeks (70 days) ago (before 25)? No    Did your last period start earlier or later than you would have expected? No    Was your last period shorter than usual? No    Was the amount of bleeding/cramping in your last period normal for you? Yes    Have you had any other recent bleeding that was not normal for you? No      Have you ever been diagnosed with:    An allergy or intolerance to mifepristone or misoprostol?  No    Chronic renal failure?  No    Hemorrhagic disorders? No    Inherited porphyria? No    Have you used systemic corticosteroid therapy long term?  No    Are you currently on anticoagulation therapy (excluding aspirin)?  No    Based on the responses given by the patient, an ultrasound is not indicated. However, she did have one on 2025.     Patient denies all contraindications, will proceed with medication termination of pregnancy.      Mandeep Roland MD        Subjective:  Belkis Sanchez is a 29 year old  at   7 weeks gestation  based on 25 ultrasound suggesting 6w5d gestation here today for medication .  Patient has been counseled on pregnancy options and desires medical termination of pregnancy.   Ultrasound performed last week showing no fetal pole consistent with likely nonviable pregnancy.   She understands that she has the option of watchful waiting and will likely have spontaneous  without medications but would like to terminate the pregnancy regardless and is interested in medication assisted treatment.       Objective:  Dating:  Patient's last menstrual period was 2025 (within days).  Ultrasound:  showing intrauterine gestational sac present, though no fetal pole identified. The gestational sac size suggests 6 weeks and 5 days. Findings are concerning for nonviable pregnancy.       OB History    Para Term  AB Living   1 1 1 0 0 1   SAB IAB Ectopic Multiple Live Births   0 0 0 0 1      # Outcome Date GA Lbr Napoleon/2nd Weight Sex Type Anes PTL Lv   1 Term 21 40w1d  3.97 kg (8 lb 12 oz) F CS-LTranv Spinal N TEVIN      Name: ALEIDA ANDREW LAE      Apgar1: 8  Apgar5: 9        Past Medical History:   Diagnosis Date    Anemia     Motion sickness        Past Surgical History:   Procedure Laterality Date    C/SECTION, CLASSICAL      LAPAROSCOPIC CHOLECYSTECTOMY N/A 2024    Procedure: CHOLECYSTECTOMY, LAPAROSCOPIC AND EXCISION OF MASS FROM  SECTION SCAR;  Surgeon: David Lilly MD;  Location: [facility]       Current Outpatient Medications   Medication Sig Dispense Refill    acetaminophen (TYLENOL) 325 MG tablet Take 2 tablets (650 mg) by mouth every 6 hours as needed for pain. 56 tablet 0    ibuprofen (ADVIL/MOTRIN) 800 MG tablet Take 1 tablet (800 mg) by mouth every 8 hours as needed for other (Cramping). Take with food 21 tablet 0    loperamide (IMODIUM A-D) 2 MG tablet Take 1 tablet (2 mg) by mouth 4 times daily as needed for diarrhea. 10 tablet 0    miFEPRIStone  "(MIFEPREX) 200 MG tablet Take 1 tablet (200 mg) by mouth once for 1 dose. 1 tablet 0    misoprostol (CYTOTEC) 200 MCG tablet Place 4 tablets (800 mcg) inside cheek once for 1 dose. 4 tablet 1    ondansetron (ZOFRAN) 4 MG tablet Take 1 tablet (4 mg) by mouth every 6 hours as needed for nausea. 10 tablet 0    ARIPiprazole (ABILIFY) 15 MG tablet Take 15 mg by mouth daily. (Patient not taking: Reported on 7/3/2025)      ARIPiprazole ER (ABILIFY MAINTENA) 300 MG extended release injection Inject 300 mg into the muscle every 28 days. (Patient not taking: Reported on 7/3/2025)      cephALEXin (KEFLEX) 500 MG capsule Take 1 capsule (500 mg) by mouth 4 times daily for 5 days. 20 capsule 0    metroNIDAZOLE (METROGEL) 0.75 % vaginal gel Place 1 applicator (5 g) vaginally daily for 5 days. 25 g 0    nitroFURantoin macrocrystal-monohydrate (MACROBID) 100 MG capsule Take 1 capsule (100 mg) by mouth 2 times daily for 5 days. 10 capsule 0     No current facility-administered medications for this visit.       No Known Allergies    Social History     Tobacco Use    Smoking status: Never     Passive exposure: Never    Smokeless tobacco: Never   Vaping Use    Vaping status: Never Used   Substance Use Topics    Alcohol use: Never    Drug use: Never        Vitals:    25 1014   BP: 98/61   Pulse: 65   Resp: 18   Temp: 98.9  F (37.2  C)   SpO2: 98%       Estimated body mass index is 21.97 kg/m  as calculated from the following:    Height as of 25: 1.473 m (4' 10\").    Weight as of 25: 47.7 kg (105 lb 1.6 oz).     Gen: well-appearing, cooperative, well-groomed      Assessment and Plan:  Belkis Sanchez is a 29 year old  at 7 weeks gestation as dated by Previous US who desires termination of pregnancy with medication .  Patient likely has nonviable pregnancy based on ultrasound performed .  We will check quantitative hCG today to confirm that this is decreasing.  Discussed that she could likely pursue watchful waiting " for spontaneous  but that medication assisted is also an option, and she prefers the latter.  She would like to pursue termination of pregnancy regardless of whether this seems to be a viable pregnancy or not.    Based the pre-medication  ultrasound assessment, ultrasound prior to  IS NOT indicated but has already been completed, as above.     The patient is appropriate for medication  with:  mifepristone and misoprostol.     Prior to the administration/prescribing of mifepristone, the patient received the medication guide and signed the patient agreement.      Mifepristone prescribed to participating pharmacy. Patient plans to take misoprostol by buccal route.    Counseled patient on   - expected bleeding: Bleeding typically starts 2-4 hours after misoprostol and can be heavy for up to 2 hours. Once pregnancy tissue passes, bleeding should slow down to menstrual type flow but can last up to 2 weeks. Patient counseled to contact our clinic or present to Emergency Department in the case of heavy bleeding (soaking more than two maxi pads per hour for 2 consecutive hours). Also counseled to contact clinic if no bleeding within 24 hours after taking misoprostol.  - pain: Counseled patient that the most severe pain occurs approximately 2-4 hours after misoprostol use and lasts 1-2 hours. Advised patient to take ibuprofen 800 mg with misoprostol and repeat as needed every 8 hours. Patient may also alternate with acetaminophen for added pain control (acetaminophen 650 mg every 6 hours).   - potential side effects of misoprostol: nausea, vomiting, diarrhea, headache, dizziness, and thermoregulatory effects such as fever, warmth, hot flushes, or chills  - follow up plan options:   Home Pregnancy Test - Take a urine pregnancy test four weeks after taking  medication(s).  Blood Test - Get a blood test on the day of your appointment or the day you take your first dose of medication to  measure the amount of pregnancy hormone (HCG) in your blood.  Get another HCG blood test 1-2 weeks after taking  medication(s).   Ultrasound - Get a vaginal ultrasound 1-2 weeks after taking  medication(s).  Patient plans two blood tests for follow up.  Also offered to discuss any contraceptive needs/plans with the patient today. Patient plans to discuss contraception options when she returns for follow up in 2 weeks.    Chart routed to RN team ([facility] Triage [id number]) who will follow up with patient via telephone within 1 week after clinic visit to assess patient.          Mandeep Roland MD

## 2025-07-09 LAB — HCG INTACT+B SERPL-ACNC: ABNORMAL MIU/ML

## 2025-07-10 ENCOUNTER — RESULTS FOLLOW-UP (OUTPATIENT)
Dept: FAMILY MEDICINE | Facility: CLINIC | Age: 30
End: 2025-07-10
Payer: COMMERCIAL

## 2025-07-10 DIAGNOSIS — Z33.2 TERMINATION OF PREGNANCY (FETUS): Primary | ICD-10-CM

## 2025-07-14 ENCOUNTER — TELEPHONE (OUTPATIENT)
Dept: FAMILY MEDICINE | Facility: CLINIC | Age: 30
End: 2025-07-14
Payer: COMMERCIAL

## 2025-07-14 NOTE — TELEPHONE ENCOUNTER
[place] [facility] [place] [facility] [place]. No answer, lvmtcb on unidentified VM. Neymar ADAMS

## 2025-07-15 NOTE — TELEPHONE ENCOUNTER
Medication Termination of Pregnancy Follow Up Assessment    Belkis Sanchez is 29 year old who had a medication . Patient took misoprostol on 25 at 10 AM.     Bleeding: Patient reports MILD bleeding: less than 1 pad per hour. Accompanied by abd cramping.     Infection: Patient denies signs/symptoms of infection, including fever.    Coping:  Patient reports feeling emotionally stable  Patient reports feeling supported at home.  Resources for coping / grief reinforced.    Follow Up:   Patient is doing two blood tests for follow up.  Results expected: few days after collected   Results will be sent to provider to finalize follow-up plan. Patient to expect call back with results / plan.    Patient encouraged to call triage as needed.     Lesia Michael RN

## 2025-07-21 ENCOUNTER — VIRTUAL VISIT (OUTPATIENT)
Dept: INTERPRETER SERVICES | Facility: CLINIC | Age: 30
End: 2025-07-21

## 2025-07-21 ENCOUNTER — OFFICE VISIT (OUTPATIENT)
Dept: FAMILY MEDICINE | Facility: CLINIC | Age: 30
End: 2025-07-21
Payer: COMMERCIAL

## 2025-07-21 VITALS
OXYGEN SATURATION: 97 % | BODY MASS INDEX: 21.17 KG/M2 | WEIGHT: 105 LBS | SYSTOLIC BLOOD PRESSURE: 94 MMHG | HEIGHT: 59 IN | DIASTOLIC BLOOD PRESSURE: 58 MMHG | HEART RATE: 80 BPM | RESPIRATION RATE: 14 BRPM | TEMPERATURE: 97.7 F

## 2025-07-21 DIAGNOSIS — Z33.2 TERMINATION OF PREGNANCY (FETUS): Primary | ICD-10-CM

## 2025-07-21 DIAGNOSIS — Z30.011 ENCOUNTER FOR INITIAL PRESCRIPTION OF CONTRACEPTIVE PILLS: ICD-10-CM

## 2025-07-21 PROBLEM — K80.20 SYMPTOMATIC CHOLELITHIASIS: Status: RESOLVED | Noted: 2024-05-17 | Resolved: 2025-07-21

## 2025-07-21 PROBLEM — F29 UNSPECIFIED PSYCHOSIS NOT DUE TO A SUBSTANCE OR KNOWN PHYSIOLOGICAL CONDITION (H): Status: ACTIVE | Noted: 2022-04-26

## 2025-07-21 PROBLEM — F20.3 UNDIFFERENTIATED SCHIZOPHRENIA (H): Status: ACTIVE | Noted: 2024-08-01

## 2025-07-21 PROCEDURE — 84702 CHORIONIC GONADOTROPIN TEST: CPT

## 2025-07-21 PROCEDURE — 36415 COLL VENOUS BLD VENIPUNCTURE: CPT

## 2025-07-21 RX ORDER — LEVONORGESTREL/ETHIN.ESTRADIOL 0.1-0.02MG
1 TABLET ORAL DAILY
Qty: 84 TABLET | Refills: 3 | Status: SHIPPED | OUTPATIENT
Start: 2025-07-21

## 2025-07-21 NOTE — PATIENT INSTRUCTIONS
Patient Education   Combination Birth Control Pills: Care Instructions  Overview    Combination birth control pills are used to prevent pregnancy. They give you a regular dose of the hormones estrogen and progestin.  You take a pill every day to prevent pregnancy.  Birth control pills come in packs. The most common type has 3 weeks of hormone pills. Some packs have sugar pills (they do not contain any hormones) for the fourth week. During that fourth no-hormone week, you have your period. After the fourth week (28 days), you start a new pack.  Some birth control pills are packaged in different ways. For example, some have hormone pills for the fourth week instead of sugar pills. This is called continuous use. Taking hormones for the entire month causes you to not have periods or to have fewer periods. Others are packaged so that you have a period every 3 months. Your doctor will tell you what type of pills you have.  Follow-up care is a key part of your treatment and safety. Be sure to make and go to all appointments, and call your doctor if you are having problems. It's also a good idea to know your test results and keep a list of the medicines you take.  How can you use it safely?  How to take the pill  Follow your doctor's instructions about when to start taking your pills. If you start your pills within 5 days of starting your period, you don't need to use a backup method of birth control. If you start your pills any other time, use backup birth control, such as a condom, or don't have vaginal sex for 7 days.  Take your pills every day, at about the same time of day. To help yourself do this, try to take them when you do something else every day, such as brushing your teeth.  You can use the pill continuously and skip your period. When you get to the week that you take hormone-free pills, skip those pills and instead start right away on your next pill pack. Continue to take your pill every day. Talk to your  doctor if you have any questions.  Check with your doctor before you use any other medicines. This includes over-the-counter medicines, vitamins, herbal products, and supplements. Birth control hormones may not work as well to prevent pregnancy when combined with other medicines.  If you forget to take a pill  Always read the label for specific instructions, or call your doctor. Here are some basic guidelines:  If you miss 1 hormone pill, take it as soon as you remember. Ask your doctor if you may need to use a backup birth control method, such as a condom, or not have vaginal sex.  If you miss 2 or more hormone pills, take one as soon as you remember you forgot them. Then read the pill label or call your doctor about instructions on how to take your missed pills. Use a backup method of birth control or don't have vaginal sex for 7 days. Pregnancy is more likely if you miss more than 1 pill.  If you had vaginal sex, you can use emergency contraception to help prevent pregnancy. The most effective emergency contraception is an intrauterine device, or IUD (inserted by a doctor). You can also get emergency contraceptive pills. You can get them with a prescription from your doctor. Or you can get them without a prescription at most drugstores.  What should you think about when using combination pills?  Some pros of using the pill  These pills work better than barrier methods, such as condoms and diaphragms.  They may reduce acne and heavy bleeding. They may also reduce cramping and other symptoms of PMS (premenstrual syndrome).  The pills let you control your periods. You can schedule your periods to be every month or every few months. Or you can choose not to have them at all. You may take pills that continue to give you hormones during the whole month (continuous use). This protects against pregnancy and is also a safe way to avoid having your period. This may help if you have painful periods.  You don't have to  "interrupt sex to use the pills.  Some cons of using the pill  You have to take a pill at the same time every day to prevent pregnancy.  Combination pills don't protect against sexually transmitted infections (STIs), such as herpes or HIV/AIDS. You can use a condom to reduce your risk of getting an STI.  They may cause changes in your period. You may have little bleeding, skipped periods, or spotting. If you're using pills that give you hormones for the whole month, your periods will stop. But you may still have breakthrough bleeding. This usually isn't harmful and may decrease over time.  They may cause mood changes or less interest in sex.  Combination pills contain estrogen. They may not be right for you if you have certain health problems.  When should you call for help?  Call 911  anytime you think you may need emergency care. For example, call if you have:  Sudden, severe chest pain.  Difficulty breathing.  Sudden, severe headache.  Call your doctor now or seek immediate medical care if you have:  Severe pain in your belly.  Headaches that:  Happen more often.  Are getting worse.  Start with auras, such as seeing spots, wavy lines, or flashing lights.  Signs of a blood clot, such as:  Pain in the calf, back of the knee, thigh, or groin.  Swelling in the leg or groin.  A color change on the leg or groin. The skin may be reddish or purplish, depending on your usual skin color.  Watch closely for changes in your health, and be sure to contact your doctor if:  You think you might be pregnant.  You think you may be depressed.  You think you may have been exposed to or have a sexually transmitted infection.  Where can you learn more?  Go to https://www.[employer].net/patiented  Enter Z218 in the search box to learn more about \"Combination Birth Control Pills: Care Instructions.\"  Current as of: April 30, 2024  Content Version: 14.5 2024-2025 [employer].   Care instructions adapted under license by your " healthcare professional. If you have questions about a medical condition or this instruction, always ask your healthcare professional. [employer] disclaims any warranty or liability for your use of this information.

## 2025-07-21 NOTE — PROGRESS NOTES
Assessment & Plan     Termination of pregnancy (fetus)  Here today to follow-up after medication assisted termination of pregnancy.  She was prescribed misoprostol and mifepristone on 7/8 and took the medications on the 10th and 11th.  Tolerated this well.  Cramping resolved after a couple days.  Bleeding has slowed but she continues to have some lighter bleeding, changing a pad approximately twice daily now.  Discussed expected course with bleeding being normal up to 6 weeks.  Counseled on signs to watch out for including fever, chills, increased pain, heavy bleeding.  Will recheck beta-hCG today to monitor for normalization.  Discussed possible need to recheck again in the future if not returned to expected value.  - hCG Quantitative Pregnancy    Encounter for initial prescription of contraceptive pills  Interested in discussing contraception today.  Has never used contraception in the past.  Discussed various options and patient is most interested in combination pill.  No history of migraines, blood clots, and is a non-smoker.  Will start with low-dose estrogen option and can adjust if needed.  Discussed that she should not start taking the oral contraceptive until her bleeding has either resolved or is down to minimal spotting.  - levonorgestrel-ethinyl estradiol (AVIANE) 0.1-20 MG-MCG tablet; Take 1 tablet by mouth daily.    Follow-up  Return if symptoms worsen or fail to improve.    Subjective   Belkis is a 29 year old, presenting for the following health issues:  Follow Up (Contraception (MTOP). Bleeding a little bit everyday for a weak after taking this medication and not sure if it's normal. )        7/21/2025     3:46 PM   Additional Questions   Roomed by Janet   Accompanied by Self         7/21/2025    Information    services provided? Yes   Language Hmong   Type of interpretation provided Video    name [facility]    Agency [facility]  Services     HPI  "     Doing well after taking the MTOP medications. Has continued to have bleeding since the meds, but it is getting lighter. Had cramping after the meds but now none. Like regular menstrual cycle initially.  Has seen minimal small clots. Today had to change two pads. Friday was every three hours. Seems to be slowing down. No fevers or chills. Not dizzy or lightheaded. Took medications on the 11th and 12th.     She also inquires about contraception options today.  States she has never used anything for contraception in the past.  She is most interested in oral combination pill.  She is not a smoker, denies personal or family history of blood clots or clotting disorders.  Denies history of migraine headaches.      Objective    BP 94/58 (BP Location: Right arm)   Pulse 80   Temp 97.7  F (36.5  C) (Tympanic)   Resp 14   Ht 1.51 m (4' 11.45\")   Wt 47.6 kg (105 lb)   LMP 05/02/2025 (Within Days)   SpO2 97%   BMI 20.89 kg/m    Body mass index is 20.89 kg/m .  Physical Exam   GENERAL: Alert, pleasant, talkative. Does not appear to be in acute distress   RESP: Speaking in full sentences, normal work of breathing   NEURO: No gross focal deficits  PSYCH: Mentation appears normal, affect normal/bright    Signed Electronically by: Mandeep Roland MD      Precepted with Dr. Rojas  "

## 2025-07-21 NOTE — PROGRESS NOTES
Preceptor Attestation:  Patient's case reviewed and discussed with Mandeep Roland MD resident and I evaluated the patient. I agree with written assessment and plan of care.  Supervising Physician:  JAIME PAZ MD  [facility]

## 2025-07-22 ENCOUNTER — APPOINTMENT (OUTPATIENT)
Dept: INTERPRETER SERVICES | Facility: CLINIC | Age: 30
End: 2025-07-22
Payer: COMMERCIAL

## 2025-07-22 ENCOUNTER — TELEPHONE (OUTPATIENT)
Dept: FAMILY MEDICINE | Facility: CLINIC | Age: 30
End: 2025-07-22
Payer: COMMERCIAL

## 2025-07-22 LAB — HCG INTACT+B SERPL-ACNC: 813 MIU/ML

## 2025-07-22 NOTE — TELEPHONE ENCOUNTER
Called and left a message.    Please help schedule patient in the lab appointment only. Future lab placed in by dr. Roland already.    If patient asked why we need to draw blood, please inform patient that we need her hcg/ pregnancy level to go back to normal.

## 2025-07-28 ENCOUNTER — TELEPHONE (OUTPATIENT)
Dept: FAMILY MEDICINE | Facility: CLINIC | Age: 30
End: 2025-07-28
Payer: COMMERCIAL

## 2025-07-28 NOTE — TELEPHONE ENCOUNTER
Called and left message.    When patient return call. Patient needs to be  in the lab appointment only to retest her hcg quant. Order is in.     Need level to be lower per MD. Can be schedule on 8/1 or anything after 8/1.

## 2025-07-30 ENCOUNTER — HOSPITAL ENCOUNTER (EMERGENCY)
Facility: HOSPITAL | Age: 30
Discharge: HOME | End: 2025-07-30
Payer: COMMERCIAL

## 2025-07-30 ENCOUNTER — VIRTUAL VISIT (OUTPATIENT)
Dept: INTERPRETER SERVICES | Facility: CLINIC | Age: 30
End: 2025-07-30
Payer: COMMERCIAL

## 2025-07-30 ENCOUNTER — APPOINTMENT (OUTPATIENT)
Dept: ULTRASOUND IMAGING | Facility: HOSPITAL | Age: 30
End: 2025-07-30
Payer: COMMERCIAL

## 2025-07-30 VITALS
BODY MASS INDEX: 20.89 KG/M2 | DIASTOLIC BLOOD PRESSURE: 60 MMHG | SYSTOLIC BLOOD PRESSURE: 105 MMHG | RESPIRATION RATE: 20 BRPM | OXYGEN SATURATION: 99 % | TEMPERATURE: 97.6 F | HEART RATE: 66 BPM | WEIGHT: 105 LBS

## 2025-07-30 DIAGNOSIS — R53.83 FEELING TIRED: ICD-10-CM

## 2025-07-30 DIAGNOSIS — Z98.890 STATUS POST DRUG-INDUCED ABORTION: Primary | ICD-10-CM

## 2025-07-30 LAB
HCG INTACT+B SERPL-ACNC: 106 MIU/ML
HOLD SPECIMEN: NORMAL

## 2025-07-30 PROCEDURE — 99284 EMERGENCY DEPT VISIT MOD MDM: CPT | Mod: 25

## 2025-07-30 PROCEDURE — 36415 COLL VENOUS BLD VENIPUNCTURE: CPT

## 2025-07-30 PROCEDURE — 84702 CHORIONIC GONADOTROPIN TEST: CPT

## 2025-07-30 PROCEDURE — T1013 SIGN LANG/ORAL INTERPRETER: HCPCS | Mod: U4,TEL,95 | Performed by: INTERPRETER

## 2025-07-30 PROCEDURE — 99285 EMERGENCY DEPT VISIT HI MDM: CPT | Mod: 25

## 2025-07-30 PROCEDURE — 76801 OB US < 14 WKS SINGLE FETUS: CPT

## 2025-07-30 ASSESSMENT — ACTIVITIES OF DAILY LIVING (ADL)
ADLS_ACUITY_SCORE: 41

## 2025-07-30 ASSESSMENT — COLUMBIA-SUICIDE SEVERITY RATING SCALE - C-SSRS
1. IN THE PAST MONTH, HAVE YOU WISHED YOU WERE DEAD OR WISHED YOU COULD GO TO SLEEP AND NOT WAKE UP?: NO
2. HAVE YOU ACTUALLY HAD ANY THOUGHTS OF KILLING YOURSELF IN THE PAST MONTH?: NO
6. HAVE YOU EVER DONE ANYTHING, STARTED TO DO ANYTHING, OR PREPARED TO DO ANYTHING TO END YOUR LIFE?: NO

## 2025-07-30 NOTE — ED TRIAGE NOTES
Pt took plan b pill that she got from her clinic and she has been getting her hormone levels checked weekly since then and has another blood draw scheduled for next week to monitor levels and make sure they are decreasing. Pt speaks Hmong and video  is used for triage. Pt states that she is here for an ultrasound to see if she is still pregnant.

## 2025-07-30 NOTE — ED PROVIDER NOTES
"Emergency Department Encounter  [facility] EMERGENCY DEPARTMENT  Belkis Sanchez   AGE: 29 year old SEX: female  YOB: 1995  PCP: Loco Montoya  MRN: [id number]      EVALUATION DATE & TIME: No admission date for patient encounter. ; PCP: Loco Montoya   ED PROVIDER: Chandrika Moran PA-C    CHIEF COMPLAINT: Pregnancy Test      FINAL IMPRESSION       ICD-10-CM    1. Status post drug-induced   Z98.890       2. Feeling tired  R53.83           MEDICAL DECISION MAKING    female with a pertinent history of recent medical abotion with misoprostol and midepristone on  who present to the ED over concerns that she continues to be pregnant because she feels \"tired.\"  Denies vaginal bleeding, abdominal pain, fever, nausea, vomiting, or urinary symptoms.  No concerns for STIs but does states she has abnormal vaginal discharge that is \"green.\"    ED Course as of 25 184   1632 I met the patient and gathered initial history and performed my physical exam.  Vitals reviewed and hemodynamically stable, afebrile.  On exam, patient well-appearing in no acute distress.  Abdomen soft and nontender.  Plan for hCG testing.  If not 0, will move forward with ultrasound imaging to check for retained products of conception.   1700 hCG Quantitative(!): 106  62,919 on 25  813 on 25  305 on 25   1839 US OB < 14 Weeks Single  No evidence of retained products of conception.   1840 We discussed results, discharge, and follow up. Questions were answered.        Medications given today in the emergency department:  Medications - No data to display    Assessment/Plan: Presentation consistent with fatigue following drug-induced .  Fortunately, no evidence of incomplete  today with downtrending beta-hCG and pelvic ultrasound without evidence of retained products of conception.  Considered anemia and vaginitis but elected not to proceed with " "evaluation today as patient denies any active bleeding or abdominal pain and has primary care evaluation scheduled within the next week.  Doubt endometritis or retained tissue related type infection. Overall, no red flag s/s present to suggest an acutely serious or life threatening condition that would necessitate hospitalization.  Plan to discharge home with close outpatient follow-up, patient has follow-up scheduled for  and . Indications for re-evaluation in the ER discussed.     New prescriptions started at today's ED visit:   New Prescriptions    No medications on file       Medical Decision Making      Discharge. No recommendations on prescription strength medication(s). See documentation for any additional details.    MIPS: Not Applicable    SEPSIS/STEMI/STROKE: None    HISTORY OF PRESENT ILLNESS   Patient information was obtained from: patient   Use of Intrepreter: yes, [employer]     Belkis Sanchez is a 29 year old  female with a pertinent history of recent medical abotion with misoprostol and midepristone on  who present to the ED via private vehicle for evaluation of pregnancy test. Patient reports that she took misoprostol and mifepristone as directed and has had follow-up visits at clinic which has shown a downtrending hCG.  Patient is concerned that she is still pregnant after having medical  on  because she feels \"tired.\"  No abdominal pain or vaginal bleeding.  Does endorse abnormal vaginal discharge that is green in color.  Denies concerns for STIs. No nausea, vomiting, fever, or urinary symptoms.     Per chart review,  2025 -  Patient seen by [facility] for determination of pregnancy.  LMP 2025.  Ultrasound on  showing IUP without fetal pole measuring 6 weeks 5 days.  hCG 62,191  2025 - Patient seen by [facility] for determination of pregnancy follow-up.  Prescribed misoprostol and " mifepristone on  and took the medications on the  and .  Recheck hCG 813.  2025 -recheck hCG 305  2025 -patient contacted that beta-hCG and continues to downtrend.  Recommended that if not back to normal within 1 week, should get ultrasound to confirm no RPOC.    MEDICAL HISTORY     Past Medical History:   Diagnosis Date    Anemia     Motion sickness        Past Surgical History:   Procedure Laterality Date    C/SECTION, CLASSICAL      LAPAROSCOPIC CHOLECYSTECTOMY N/A 2024    Procedure: CHOLECYSTECTOMY, LAPAROSCOPIC AND EXCISION OF MASS FROM  SECTION SCAR;  Surgeon: David Lilly MD;  Location: [facility]       Family History   Problem Relation Age of Onset    No Known Problems Mother     No Known Problems Father     No Known Problems Maternal Grandmother     No Known Problems Maternal Grandfather     No Known Problems Paternal Grandmother     No Known Problems Paternal Grandfather     No Known Problems Brother     No Known Problems Sister     No Known Problems Son     No Known Problems Daughter     No Known Problems Maternal Half-Brother     No Known Problems Maternal Half-Sister     No Known Problems Paternal Half-Brother     No Known Problems Paternal Half-Sister     No Known Problems Niece     No Known Problems Nephew     No Known Problems Cousin     No Known Problems Other     Cancer No family hx of     Diabetes No family hx of     Coronary Artery Disease No family hx of     Heart Disease No family hx of     Hypertension No family hx of     Hyperlipidemia No family hx of     Kidney Disease No family hx of     Cerebrovascular Disease No family hx of     Obesity No family hx of     Thrombosis No family hx of     Asthma No family hx of     Arthritis No family hx of     Thyroid Disease No family hx of     Depression No family hx of     Mental Illness No family hx of     Substance Abuse No family hx of     Cystic Fibrosis No family hx of     Early Death No family hx of      Coronary Artery Disease Early Onset No family hx of     Heart Failure No family hx of     Bleeding Diathesis No family hx of     Dementia No family hx of     Breast Cancer No family hx of     Ovarian Cancer No family hx of     Uterine Cancer No family hx of     Prostate Cancer No family hx of     Colorectal Cancer No family hx of     Pancreatic Cancer No family hx of     Lung Cancer No family hx of     Melanoma No family hx of     Autoimmune Disease No family hx of     Unknown/Adopted No family hx of     Genetic Disorder No family hx of        Social History     Tobacco Use    Smoking status: Never     Passive exposure: Never    Smokeless tobacco: Never   Vaping Use    Vaping status: Never Used   Substance Use Topics    Alcohol use: Never    Drug use: Never       Most Recent Immunizations   Administered Date(s) Administered    COVID-19 12+ (Pfizer) 03/20/2024    COVID-19 Monovalent 18+ (Moderna) 07/29/2021    Hep B, Adult (Heplisav- B) 03/20/2024    Influenza Vaccine >6 months,quad, PF 03/20/2024    Influenza,INJ,MDCK,PF,Quad >6mo(Flucelvax) 12/23/2020    TDAP (Adacel,Boostrix) 04/07/2021        acetaminophen (TYLENOL) 325 MG tablet  ARIPiprazole (ABILIFY) 15 MG tablet  ARIPiprazole ER (ABILIFY MAINTENA) 300 MG extended release injection  ibuprofen (ADVIL/MOTRIN) 800 MG tablet  levonorgestrel-ethinyl estradiol (AVIANE) 0.1-20 MG-MCG tablet  loperamide (IMODIUM A-D) 2 MG tablet  miFEPRIStone (MIFEPREX) 200 MG tablet  ondansetron (ZOFRAN) 4 MG tablet        PHYSICAL EXAM   VITALS:  Patient Vitals for the past 24 hrs:   BP Temp Temp src Pulse Resp SpO2 Weight   07/30/25 1700 -- -- -- 65 -- 99 % --   07/30/25 1645 -- -- -- 67 -- 100 % --   07/30/25 1640 -- -- -- 65 -- 99 % --   07/30/25 1535 -- -- -- -- -- -- 47.6 kg (105 lb)   07/30/25 1533 105/60 97.6  F (36.4  C) Oral 68 20 99 % --     Body mass index is 20.89 kg/m .     Vitals reviewed. Nursing notes reviewed.  CONSITUTIONAL: Generally well appearing female in no  acute distress. Well developed and nourished.   EYES: Conjunctivae clear without exudates or hemorrhage. Sclera non-icteric. EOM grossly intact.   HENT: Normocephalic, atraumatic.  Moist mucous membranes.   NECK: Supple, gross ROM intact.   RESPIRATORY: Unlabored respiratory effort, speaks in full sentences.  Lungs clear to auscultation bilaterally without rhonchi, wheezes, rales.   CARDIO: Normal heart rate, regular rhythm, no murmurs. No pedal edema.   GI: Soft, nondistended. Abdomen is non tender to palpation.  MSK: Moving all 4 extremities intentionally and without pain. No joint swelling, redness, or obvious deformity.  SKIN: Warm, dry. No rashes or lesions.  NEURO:  AxOx4. CN II-XII grossly intact, but not individually tested. No focal neurological deficits.   PSYCH: Thoughts linear and responses appropriate. Cooperative. Appropriate mood and affect.     LABS & IMAGING   All pertinent labs and imaging reviewed and interpreted.  Results for orders placed or performed during the hospital encounter of 07/30/25   US OB < 14 Weeks Single    Impression    IMPRESSION:   1.  No ultrasound evidence of retained products of conception.   HCG quantitative pregnancy (blood)   Result Value Ref Range    hCG Quantitative 106 (H) <5 mIU/mL   Extra Red Top Tube   Result Value Ref Range    Hold Specimen JI        Chandrika Moran PA-C   Emergency Medicine   [facility] EMERGENCY DEPARTMENT  [facility] [facility] [id number]  [phone]  Dept: [phone]     Chandrika Moran PA-C  07/30/25 2024

## 2025-08-04 ENCOUNTER — LAB (OUTPATIENT)
Dept: LAB | Facility: CLINIC | Age: 30
End: 2025-08-04
Payer: COMMERCIAL

## 2025-08-04 DIAGNOSIS — Z33.2 TERMINATION OF PREGNANCY (FETUS): ICD-10-CM

## 2025-08-04 PROCEDURE — 36415 COLL VENOUS BLD VENIPUNCTURE: CPT

## 2025-08-04 PROCEDURE — 84702 CHORIONIC GONADOTROPIN TEST: CPT

## 2025-08-05 LAB — HCG INTACT+B SERPL-ACNC: 61 MIU/ML

## 2025-08-07 ENCOUNTER — TELEPHONE (OUTPATIENT)
Dept: FAMILY MEDICINE | Facility: CLINIC | Age: 30
End: 2025-08-07
Payer: COMMERCIAL

## 2025-08-14 ENCOUNTER — OFFICE VISIT (OUTPATIENT)
Dept: FAMILY MEDICINE | Facility: CLINIC | Age: 30
End: 2025-08-14
Payer: COMMERCIAL

## 2025-08-14 VITALS
BODY MASS INDEX: 21.07 KG/M2 | TEMPERATURE: 98.9 F | RESPIRATION RATE: 20 BRPM | SYSTOLIC BLOOD PRESSURE: 102 MMHG | OXYGEN SATURATION: 99 % | WEIGHT: 104.5 LBS | HEART RATE: 69 BPM | HEIGHT: 59 IN | DIASTOLIC BLOOD PRESSURE: 64 MMHG

## 2025-08-14 DIAGNOSIS — Z11.3 ROUTINE SCREENING FOR STI (SEXUALLY TRANSMITTED INFECTION): ICD-10-CM

## 2025-08-14 DIAGNOSIS — R07.9 CHEST PAIN, UNSPECIFIED TYPE: ICD-10-CM

## 2025-08-14 DIAGNOSIS — Z33.2 TERMINATION OF PREGNANCY (FETUS): ICD-10-CM

## 2025-08-14 DIAGNOSIS — Z30.09 GENERAL COUNSELING FOR PRESCRIPTION OF ORAL CONTRACEPTIVES: ICD-10-CM

## 2025-08-14 DIAGNOSIS — K21.00 GASTROESOPHAGEAL REFLUX DISEASE WITH ESOPHAGITIS, UNSPECIFIED WHETHER HEMORRHAGE: ICD-10-CM

## 2025-08-14 DIAGNOSIS — Z00.00 ROUTINE GENERAL MEDICAL EXAMINATION AT A HEALTH CARE FACILITY: Primary | ICD-10-CM

## 2025-08-14 DIAGNOSIS — F29 PSYCHOSIS, UNSPECIFIED PSYCHOSIS TYPE (H): ICD-10-CM

## 2025-08-14 DIAGNOSIS — R06.6 HICCUPS: ICD-10-CM

## 2025-08-14 LAB
ERYTHROCYTE [DISTWIDTH] IN BLOOD BY AUTOMATED COUNT: 11.8 % (ref 10–15)
EST. AVERAGE GLUCOSE BLD GHB EST-MCNC: 100 MG/DL
HBA1C MFR BLD: 5.1 % (ref 0–5.6)
HCT VFR BLD AUTO: 36.5 % (ref 35–47)
HGB BLD-MCNC: 11.7 G/DL (ref 11.7–15.7)
MCH RBC QN AUTO: 30.9 PG (ref 26.5–33)
MCHC RBC AUTO-ENTMCNC: 32.1 G/DL (ref 31.5–36.5)
MCV RBC AUTO: 96.3 FL (ref 78–100)
PLATELET # BLD AUTO: 285 10E3/UL (ref 150–450)
RBC # BLD AUTO: 3.79 10E6/UL (ref 3.8–5.2)
WBC # BLD AUTO: 7.4 10E3/UL (ref 4–11)

## 2025-08-14 PROCEDURE — 84702 CHORIONIC GONADOTROPIN TEST: CPT

## 2025-08-14 PROCEDURE — 85027 COMPLETE CBC AUTOMATED: CPT

## 2025-08-14 PROCEDURE — 36415 COLL VENOUS BLD VENIPUNCTURE: CPT

## 2025-08-14 PROCEDURE — 80061 LIPID PANEL: CPT

## 2025-08-14 PROCEDURE — 80053 COMPREHEN METABOLIC PANEL: CPT

## 2025-08-14 PROCEDURE — 87210 SMEAR WET MOUNT SALINE/INK: CPT

## 2025-08-14 PROCEDURE — 83036 HEMOGLOBIN GLYCOSYLATED A1C: CPT

## 2025-08-14 RX ORDER — OMEPRAZOLE 20 MG/1
20 CAPSULE, DELAYED RELEASE ORAL DAILY
Qty: 30 CAPSULE | Refills: 0 | Status: SHIPPED | OUTPATIENT
Start: 2025-08-14

## 2025-08-14 SDOH — HEALTH STABILITY: PHYSICAL HEALTH: ON AVERAGE, HOW MANY DAYS PER WEEK DO YOU ENGAGE IN MODERATE TO STRENUOUS EXERCISE (LIKE A BRISK WALK)?: 3 DAYS

## 2025-08-14 ASSESSMENT — SOCIAL DETERMINANTS OF HEALTH (SDOH): HOW OFTEN DO YOU GET TOGETHER WITH FRIENDS OR RELATIVES?: ONCE A WEEK

## 2025-08-14 ASSESSMENT — ANXIETY QUESTIONNAIRES
IF YOU CHECKED OFF ANY PROBLEMS ON THIS QUESTIONNAIRE, HOW DIFFICULT HAVE THESE PROBLEMS MADE IT FOR YOU TO DO YOUR WORK, TAKE CARE OF THINGS AT HOME, OR GET ALONG WITH OTHER PEOPLE: NOT DIFFICULT AT ALL
7. FEELING AFRAID AS IF SOMETHING AWFUL MIGHT HAPPEN: NOT AT ALL
3. WORRYING TOO MUCH ABOUT DIFFERENT THINGS: NOT AT ALL
5. BEING SO RESTLESS THAT IT IS HARD TO SIT STILL: NOT AT ALL
1. FEELING NERVOUS, ANXIOUS, OR ON EDGE: NOT AT ALL
7. FEELING AFRAID AS IF SOMETHING AWFUL MIGHT HAPPEN: NOT AT ALL
4. TROUBLE RELAXING: NOT AT ALL
GAD7 TOTAL SCORE: 0
GAD7 TOTAL SCORE: 0
2. NOT BEING ABLE TO STOP OR CONTROL WORRYING: NOT AT ALL
8. IF YOU CHECKED OFF ANY PROBLEMS, HOW DIFFICULT HAVE THESE MADE IT FOR YOU TO DO YOUR WORK, TAKE CARE OF THINGS AT HOME, OR GET ALONG WITH OTHER PEOPLE?: NOT DIFFICULT AT ALL
6. BECOMING EASILY ANNOYED OR IRRITABLE: NOT AT ALL
GAD7 TOTAL SCORE: 0

## 2025-08-14 ASSESSMENT — PATIENT HEALTH QUESTIONNAIRE - PHQ9
SUM OF ALL RESPONSES TO PHQ QUESTIONS 1-9: 0
10. IF YOU CHECKED OFF ANY PROBLEMS, HOW DIFFICULT HAVE THESE PROBLEMS MADE IT FOR YOU TO DO YOUR WORK, TAKE CARE OF THINGS AT HOME, OR GET ALONG WITH OTHER PEOPLE: NOT DIFFICULT AT ALL
SUM OF ALL RESPONSES TO PHQ QUESTIONS 1-9: 0

## 2025-08-15 LAB
ALBUMIN SERPL BCG-MCNC: 4 G/DL (ref 3.5–5.2)
ALP SERPL-CCNC: 60 U/L (ref 40–150)
ALT SERPL W P-5'-P-CCNC: 11 U/L (ref 0–50)
ANION GAP SERPL CALCULATED.3IONS-SCNC: 10 MMOL/L (ref 7–15)
AST SERPL W P-5'-P-CCNC: 22 U/L (ref 0–45)
BILIRUB SERPL-MCNC: 0.6 MG/DL
BUN SERPL-MCNC: 9 MG/DL (ref 6–20)
CALCIUM SERPL-MCNC: 9.3 MG/DL (ref 8.8–10.4)
CHLORIDE SERPL-SCNC: 103 MMOL/L (ref 98–107)
CHOLEST SERPL-MCNC: 158 MG/DL
CLUE CELLS: ABNORMAL
CREAT SERPL-MCNC: 0.55 MG/DL (ref 0.51–0.95)
EGFRCR SERPLBLD CKD-EPI 2021: >90 ML/MIN/1.73M2
FASTING STATUS PATIENT QL REPORTED: NO
FASTING STATUS PATIENT QL REPORTED: NO
GLUCOSE SERPL-MCNC: 91 MG/DL (ref 70–99)
HCG INTACT+B SERPL-ACNC: 13 MIU/ML
HCO3 SERPL-SCNC: 27 MMOL/L (ref 22–29)
HDLC SERPL-MCNC: 52 MG/DL
LDLC SERPL CALC-MCNC: 87 MG/DL
NONHDLC SERPL-MCNC: 106 MG/DL
POTASSIUM SERPL-SCNC: 3.7 MMOL/L (ref 3.4–5.3)
PROT SERPL-MCNC: 7.4 G/DL (ref 6.4–8.3)
SODIUM SERPL-SCNC: 140 MMOL/L (ref 135–145)
TRICHOMONAS, WET PREP: ABNORMAL
TRIGL SERPL-MCNC: 97 MG/DL
WBC'S/HIGH POWER FIELD, WET PREP: ABNORMAL
YEAST, WET PREP: ABNORMAL